# Patient Record
Sex: FEMALE | Race: WHITE | NOT HISPANIC OR LATINO
[De-identification: names, ages, dates, MRNs, and addresses within clinical notes are randomized per-mention and may not be internally consistent; named-entity substitution may affect disease eponyms.]

---

## 2018-03-28 ENCOUNTER — APPOINTMENT (OUTPATIENT)
Dept: OBGYN | Facility: CLINIC | Age: 33
End: 2018-03-28
Payer: COMMERCIAL

## 2018-03-28 VITALS
HEIGHT: 68 IN | SYSTOLIC BLOOD PRESSURE: 120 MMHG | WEIGHT: 140 LBS | DIASTOLIC BLOOD PRESSURE: 80 MMHG | BODY MASS INDEX: 21.22 KG/M2

## 2018-03-28 DIAGNOSIS — Z01.419 ENCOUNTER FOR GYNECOLOGICAL EXAMINATION (GENERAL) (ROUTINE) W/OUT ABNORMAL FINDINGS: ICD-10-CM

## 2018-03-28 PROCEDURE — 99395 PREV VISIT EST AGE 18-39: CPT

## 2018-03-30 LAB — HPV HIGH+LOW RISK DNA PNL CVX: NOT DETECTED

## 2018-04-02 LAB — CYTOLOGY CVX/VAG DOC THIN PREP: NORMAL

## 2018-12-26 ENCOUNTER — ASOB RESULT (OUTPATIENT)
Age: 33
End: 2018-12-26

## 2018-12-26 ENCOUNTER — APPOINTMENT (OUTPATIENT)
Dept: ANTEPARTUM | Facility: CLINIC | Age: 33
End: 2018-12-26
Payer: COMMERCIAL

## 2018-12-26 ENCOUNTER — APPOINTMENT (OUTPATIENT)
Dept: OBGYN | Facility: CLINIC | Age: 33
End: 2018-12-26
Payer: COMMERCIAL

## 2018-12-26 VITALS
WEIGHT: 140 LBS | SYSTOLIC BLOOD PRESSURE: 120 MMHG | DIASTOLIC BLOOD PRESSURE: 60 MMHG | BODY MASS INDEX: 21.22 KG/M2 | HEIGHT: 68 IN

## 2018-12-26 LAB — HCG UR QL: POSITIVE

## 2018-12-26 PROCEDURE — 99213 OFFICE O/P EST LOW 20 MIN: CPT

## 2018-12-26 PROCEDURE — 76817 TRANSVAGINAL US OBSTETRIC: CPT

## 2018-12-26 PROCEDURE — 81025 URINE PREGNANCY TEST: CPT

## 2019-01-09 ENCOUNTER — ASOB RESULT (OUTPATIENT)
Age: 34
End: 2019-01-09

## 2019-01-09 ENCOUNTER — APPOINTMENT (OUTPATIENT)
Dept: OBGYN | Facility: CLINIC | Age: 34
End: 2019-01-09
Payer: COMMERCIAL

## 2019-01-09 VITALS
HEIGHT: 68 IN | WEIGHT: 136 LBS | SYSTOLIC BLOOD PRESSURE: 112 MMHG | BODY MASS INDEX: 20.61 KG/M2 | DIASTOLIC BLOOD PRESSURE: 70 MMHG

## 2019-01-09 PROCEDURE — 0501F PRENATAL FLOW SHEET: CPT

## 2019-01-09 PROCEDURE — 76801 OB US < 14 WKS SINGLE FETUS: CPT

## 2019-01-16 ENCOUNTER — LABORATORY RESULT (OUTPATIENT)
Age: 34
End: 2019-01-16

## 2019-01-26 ENCOUNTER — APPOINTMENT (OUTPATIENT)
Dept: ANTEPARTUM | Facility: CLINIC | Age: 34
End: 2019-01-26
Payer: COMMERCIAL

## 2019-01-26 ENCOUNTER — ASOB RESULT (OUTPATIENT)
Age: 34
End: 2019-01-26

## 2019-01-26 PROCEDURE — 76813 OB US NUCHAL MEAS 1 GEST: CPT

## 2019-01-26 PROCEDURE — 36416 COLLJ CAPILLARY BLOOD SPEC: CPT

## 2019-01-30 ENCOUNTER — APPOINTMENT (OUTPATIENT)
Age: 34
End: 2019-01-30

## 2019-01-31 LAB
1ST TRIMESTER DATA: NORMAL
ADDENDUM DOC: NORMAL
AFP PNL SERPL: NORMAL
AFP SERPL-ACNC: NORMAL
CLINICAL BIOCHEMIST REVIEW: NORMAL
FREE BETA HCG 1ST TRIMESTER: NORMAL
Lab: NORMAL
NOTES NTD: NORMAL
NT: NORMAL
PAPP-A SERPL-ACNC: NORMAL
TRISOMY 18/3: NORMAL

## 2019-02-06 ENCOUNTER — APPOINTMENT (OUTPATIENT)
Dept: OBGYN | Facility: CLINIC | Age: 34
End: 2019-02-06
Payer: COMMERCIAL

## 2019-02-06 VITALS
HEIGHT: 68 IN | DIASTOLIC BLOOD PRESSURE: 74 MMHG | BODY MASS INDEX: 20.76 KG/M2 | SYSTOLIC BLOOD PRESSURE: 130 MMHG | WEIGHT: 137 LBS

## 2019-02-06 LAB
GLUCOSE UR-MCNC: NORMAL
PROT UR STRIP-MCNC: NORMAL

## 2019-02-06 PROCEDURE — 0502F SUBSEQUENT PRENATAL CARE: CPT

## 2019-02-13 ENCOUNTER — APPOINTMENT (OUTPATIENT)
Dept: ANTEPARTUM | Facility: CLINIC | Age: 34
End: 2019-02-13

## 2019-02-13 ENCOUNTER — APPOINTMENT (OUTPATIENT)
Dept: MATERNAL FETAL MEDICINE | Facility: CLINIC | Age: 34
End: 2019-02-13

## 2019-03-06 ENCOUNTER — APPOINTMENT (OUTPATIENT)
Dept: OBGYN | Facility: CLINIC | Age: 34
End: 2019-03-06
Payer: COMMERCIAL

## 2019-03-06 ENCOUNTER — NON-APPOINTMENT (OUTPATIENT)
Age: 34
End: 2019-03-06

## 2019-03-06 VITALS
SYSTOLIC BLOOD PRESSURE: 128 MMHG | HEIGHT: 68 IN | DIASTOLIC BLOOD PRESSURE: 86 MMHG | WEIGHT: 141 LBS | BODY MASS INDEX: 21.37 KG/M2

## 2019-03-06 PROCEDURE — 0502F SUBSEQUENT PRENATAL CARE: CPT

## 2019-03-27 ENCOUNTER — APPOINTMENT (OUTPATIENT)
Dept: ANTEPARTUM | Facility: CLINIC | Age: 34
End: 2019-03-27
Payer: COMMERCIAL

## 2019-03-27 ENCOUNTER — ASOB RESULT (OUTPATIENT)
Age: 34
End: 2019-03-27

## 2019-03-27 PROCEDURE — 76805 OB US >/= 14 WKS SNGL FETUS: CPT

## 2019-03-28 ENCOUNTER — APPOINTMENT (OUTPATIENT)
Dept: ANTEPARTUM | Facility: CLINIC | Age: 34
End: 2019-03-28

## 2019-04-01 LAB
1ST TRIMESTER DATA: NORMAL
2ND TRIMESTER DATA: NORMAL
AFP PNL SERPL: NORMAL
AFP SERPL-ACNC: NORMAL
AFP SERPL-ACNC: NORMAL
B-HCG FREE SERPL-MCNC: NORMAL
CLINICAL BIOCHEMIST REVIEW: NORMAL
FREE BETA HCG 1ST TRIMESTER: NORMAL
INHIBIN A SERPL-MCNC: NORMAL
NOTES NTD: NORMAL
NT: NORMAL
PAPP-A SERPL-ACNC: NORMAL
U ESTRIOL SERPL-SCNC: NORMAL

## 2019-04-03 ENCOUNTER — NON-APPOINTMENT (OUTPATIENT)
Age: 34
End: 2019-04-03

## 2019-04-03 ENCOUNTER — APPOINTMENT (OUTPATIENT)
Dept: OBGYN | Facility: CLINIC | Age: 34
End: 2019-04-03
Payer: COMMERCIAL

## 2019-04-03 VITALS
HEIGHT: 68 IN | WEIGHT: 142 LBS | BODY MASS INDEX: 21.52 KG/M2 | SYSTOLIC BLOOD PRESSURE: 128 MMHG | DIASTOLIC BLOOD PRESSURE: 70 MMHG

## 2019-04-03 PROCEDURE — 0502F SUBSEQUENT PRENATAL CARE: CPT

## 2019-04-15 ENCOUNTER — APPOINTMENT (OUTPATIENT)
Dept: MATERNAL FETAL MEDICINE | Facility: CLINIC | Age: 34
End: 2019-04-15
Payer: COMMERCIAL

## 2019-04-15 ENCOUNTER — ASOB RESULT (OUTPATIENT)
Age: 34
End: 2019-04-15

## 2019-04-15 PROCEDURE — 99241 OFFICE CONSULTATION NEW/ESTAB PATIENT 15 MIN: CPT

## 2019-04-26 ENCOUNTER — ASOB RESULT (OUTPATIENT)
Age: 34
End: 2019-04-26

## 2019-04-26 ENCOUNTER — APPOINTMENT (OUTPATIENT)
Age: 34
End: 2019-04-26
Payer: COMMERCIAL

## 2019-04-26 PROCEDURE — 76816 OB US FOLLOW-UP PER FETUS: CPT

## 2019-05-01 ENCOUNTER — LABORATORY RESULT (OUTPATIENT)
Age: 34
End: 2019-05-01

## 2019-05-01 ENCOUNTER — APPOINTMENT (OUTPATIENT)
Dept: OBGYN | Facility: CLINIC | Age: 34
End: 2019-05-01
Payer: COMMERCIAL

## 2019-05-01 VITALS
BODY MASS INDEX: 21.98 KG/M2 | WEIGHT: 145 LBS | HEIGHT: 68 IN | SYSTOLIC BLOOD PRESSURE: 120 MMHG | DIASTOLIC BLOOD PRESSURE: 68 MMHG

## 2019-05-01 PROCEDURE — 0502F SUBSEQUENT PRENATAL CARE: CPT

## 2019-05-02 ENCOUNTER — TRANSCRIPTION ENCOUNTER (OUTPATIENT)
Age: 34
End: 2019-05-02

## 2019-05-28 ENCOUNTER — APPOINTMENT (OUTPATIENT)
Dept: OBGYN | Facility: CLINIC | Age: 34
End: 2019-05-28
Payer: COMMERCIAL

## 2019-05-28 VITALS — DIASTOLIC BLOOD PRESSURE: 76 MMHG | SYSTOLIC BLOOD PRESSURE: 121 MMHG | HEIGHT: 68 IN

## 2019-05-28 PROCEDURE — 0502F SUBSEQUENT PRENATAL CARE: CPT

## 2019-06-12 ENCOUNTER — ASOB RESULT (OUTPATIENT)
Age: 34
End: 2019-06-12

## 2019-06-12 ENCOUNTER — APPOINTMENT (OUTPATIENT)
Dept: OBGYN | Facility: CLINIC | Age: 34
End: 2019-06-12
Payer: COMMERCIAL

## 2019-06-12 ENCOUNTER — APPOINTMENT (OUTPATIENT)
Dept: ANTEPARTUM | Facility: CLINIC | Age: 34
End: 2019-06-12
Payer: COMMERCIAL

## 2019-06-12 VITALS
HEIGHT: 68 IN | SYSTOLIC BLOOD PRESSURE: 120 MMHG | BODY MASS INDEX: 22.73 KG/M2 | WEIGHT: 150 LBS | DIASTOLIC BLOOD PRESSURE: 60 MMHG

## 2019-06-12 PROCEDURE — 76816 OB US FOLLOW-UP PER FETUS: CPT

## 2019-06-12 PROCEDURE — 0502F SUBSEQUENT PRENATAL CARE: CPT

## 2019-06-19 ENCOUNTER — APPOINTMENT (OUTPATIENT)
Dept: ANTEPARTUM | Facility: CLINIC | Age: 34
End: 2019-06-19

## 2019-06-28 ENCOUNTER — APPOINTMENT (OUTPATIENT)
Dept: OBGYN | Facility: CLINIC | Age: 34
End: 2019-06-28
Payer: COMMERCIAL

## 2019-06-28 VITALS
WEIGHT: 151 LBS | BODY MASS INDEX: 22.88 KG/M2 | DIASTOLIC BLOOD PRESSURE: 70 MMHG | SYSTOLIC BLOOD PRESSURE: 120 MMHG | HEIGHT: 68 IN

## 2019-06-28 PROCEDURE — 0502F SUBSEQUENT PRENATAL CARE: CPT

## 2019-07-08 ENCOUNTER — APPOINTMENT (OUTPATIENT)
Dept: OBGYN | Facility: CLINIC | Age: 34
End: 2019-07-08
Payer: COMMERCIAL

## 2019-07-08 ENCOUNTER — LABORATORY RESULT (OUTPATIENT)
Age: 34
End: 2019-07-08

## 2019-07-08 VITALS
WEIGHT: 155 LBS | BODY MASS INDEX: 23.49 KG/M2 | DIASTOLIC BLOOD PRESSURE: 79 MMHG | SYSTOLIC BLOOD PRESSURE: 121 MMHG | HEIGHT: 68 IN

## 2019-07-08 PROCEDURE — 90471 IMMUNIZATION ADMIN: CPT

## 2019-07-08 PROCEDURE — 90715 TDAP VACCINE 7 YRS/> IM: CPT

## 2019-07-08 PROCEDURE — 0502F SUBSEQUENT PRENATAL CARE: CPT

## 2019-07-10 ENCOUNTER — LABORATORY RESULT (OUTPATIENT)
Age: 34
End: 2019-07-10

## 2019-07-17 ENCOUNTER — APPOINTMENT (OUTPATIENT)
Dept: OBGYN | Facility: CLINIC | Age: 34
End: 2019-07-17
Payer: COMMERCIAL

## 2019-07-17 ENCOUNTER — APPOINTMENT (OUTPATIENT)
Dept: ANTEPARTUM | Facility: CLINIC | Age: 34
End: 2019-07-17

## 2019-07-17 VITALS
SYSTOLIC BLOOD PRESSURE: 120 MMHG | WEIGHT: 156 LBS | BODY MASS INDEX: 23.64 KG/M2 | DIASTOLIC BLOOD PRESSURE: 70 MMHG | HEIGHT: 68 IN

## 2019-07-17 PROCEDURE — 0502F SUBSEQUENT PRENATAL CARE: CPT

## 2019-07-24 ENCOUNTER — APPOINTMENT (OUTPATIENT)
Dept: ANTEPARTUM | Facility: CLINIC | Age: 34
End: 2019-07-24
Payer: COMMERCIAL

## 2019-07-24 ENCOUNTER — APPOINTMENT (OUTPATIENT)
Dept: OBGYN | Facility: CLINIC | Age: 34
End: 2019-07-24
Payer: COMMERCIAL

## 2019-07-24 ENCOUNTER — ASOB RESULT (OUTPATIENT)
Age: 34
End: 2019-07-24

## 2019-07-24 VITALS
SYSTOLIC BLOOD PRESSURE: 120 MMHG | WEIGHT: 156 LBS | HEIGHT: 68 IN | BODY MASS INDEX: 23.64 KG/M2 | DIASTOLIC BLOOD PRESSURE: 79 MMHG

## 2019-07-24 PROCEDURE — 0502F SUBSEQUENT PRENATAL CARE: CPT

## 2019-07-24 PROCEDURE — 76816 OB US FOLLOW-UP PER FETUS: CPT

## 2019-07-31 ENCOUNTER — APPOINTMENT (OUTPATIENT)
Dept: OBGYN | Facility: CLINIC | Age: 34
End: 2019-07-31
Payer: COMMERCIAL

## 2019-07-31 VITALS
WEIGHT: 156 LBS | SYSTOLIC BLOOD PRESSURE: 123 MMHG | BODY MASS INDEX: 23.64 KG/M2 | HEIGHT: 68 IN | DIASTOLIC BLOOD PRESSURE: 74 MMHG

## 2019-07-31 PROCEDURE — 0502F SUBSEQUENT PRENATAL CARE: CPT

## 2019-08-05 ENCOUNTER — INPATIENT (INPATIENT)
Facility: HOSPITAL | Age: 34
LOS: 2 days | Discharge: HOME | End: 2019-08-08
Payer: COMMERCIAL

## 2019-08-05 ENCOUNTER — APPOINTMENT (OUTPATIENT)
Dept: OBGYN | Facility: CLINIC | Age: 34
End: 2019-08-05
Payer: COMMERCIAL

## 2019-08-05 VITALS
DIASTOLIC BLOOD PRESSURE: 72 MMHG | SYSTOLIC BLOOD PRESSURE: 112 MMHG | HEIGHT: 68 IN | BODY MASS INDEX: 23.64 KG/M2 | WEIGHT: 156 LBS

## 2019-08-05 VITALS — RESPIRATION RATE: 14 BRPM | TEMPERATURE: 98 F

## 2019-08-05 DIAGNOSIS — O26.893 OTHER SPECIFIED PREGNANCY RELATED CONDITIONS, THIRD TRIMESTER: ICD-10-CM

## 2019-08-05 DIAGNOSIS — O47.1 FALSE LABOR AT OR AFTER 37 COMPLETED WEEKS OF GESTATION: ICD-10-CM

## 2019-08-05 LAB
APPEARANCE UR: CLEAR — SIGNIFICANT CHANGE UP
BASOPHILS # BLD AUTO: 0.05 K/UL — SIGNIFICANT CHANGE UP (ref 0–0.2)
BASOPHILS NFR BLD AUTO: 0.4 % — SIGNIFICANT CHANGE UP (ref 0–2)
BILIRUB UR-MCNC: NEGATIVE — SIGNIFICANT CHANGE UP
BLD GP AB SCN SERPL QL: SIGNIFICANT CHANGE UP
COLOR SPEC: YELLOW — SIGNIFICANT CHANGE UP
DIFF PNL FLD: ABNORMAL
EOSINOPHIL # BLD AUTO: 0.03 K/UL — SIGNIFICANT CHANGE UP (ref 0–0.5)
EOSINOPHIL NFR BLD AUTO: 0.2 % — SIGNIFICANT CHANGE UP (ref 0–6)
EPI CELLS # UR: SIGNIFICANT CHANGE UP
GLUCOSE UR QL: NEGATIVE MG/DL — SIGNIFICANT CHANGE UP
HCT VFR BLD CALC: 39.7 % — SIGNIFICANT CHANGE UP (ref 34.5–45)
HGB BLD-MCNC: 13.3 G/DL — SIGNIFICANT CHANGE UP (ref 11.5–15.5)
IMM GRANULOCYTES NFR BLD AUTO: 1.4 % — SIGNIFICANT CHANGE UP (ref 0–1.5)
KETONES UR-MCNC: ABNORMAL
LEUKOCYTE ESTERASE UR-ACNC: NEGATIVE — SIGNIFICANT CHANGE UP
LYMPHOCYTES # BLD AUTO: 15.3 % — SIGNIFICANT CHANGE UP (ref 13–44)
LYMPHOCYTES # BLD AUTO: 2.01 K/UL — SIGNIFICANT CHANGE UP (ref 1–3.3)
MCHC RBC-ENTMCNC: 29.8 PG — SIGNIFICANT CHANGE UP (ref 27–34)
MCHC RBC-ENTMCNC: 33.5 GM/DL — SIGNIFICANT CHANGE UP (ref 32–36)
MCV RBC AUTO: 88.8 FL — SIGNIFICANT CHANGE UP (ref 80–100)
MONOCYTES # BLD AUTO: 0.95 K/UL — HIGH (ref 0–0.9)
MONOCYTES NFR BLD AUTO: 7.2 % — SIGNIFICANT CHANGE UP (ref 2–14)
NEUTROPHILS # BLD AUTO: 9.95 K/UL — HIGH (ref 1.8–7.4)
NEUTROPHILS NFR BLD AUTO: 75.5 % — SIGNIFICANT CHANGE UP (ref 43–77)
NITRITE UR-MCNC: NEGATIVE — SIGNIFICANT CHANGE UP
PH UR: 6 — SIGNIFICANT CHANGE UP (ref 5–8)
PLATELET # BLD AUTO: 280 K/UL — SIGNIFICANT CHANGE UP (ref 150–400)
PROT UR-MCNC: 15 MG/DL
RBC # BLD: 4.47 M/UL — SIGNIFICANT CHANGE UP (ref 3.8–5.2)
RBC # FLD: 13.2 % — SIGNIFICANT CHANGE UP (ref 10.3–14.5)
RBC CASTS # UR COMP ASSIST: SIGNIFICANT CHANGE UP /HPF (ref 0–4)
SP GR SPEC: 1.02 — SIGNIFICANT CHANGE UP (ref 1.01–1.02)
UROBILINOGEN FLD QL: NEGATIVE MG/DL — SIGNIFICANT CHANGE UP
WBC # BLD: 13.18 K/UL — HIGH (ref 3.8–10.5)
WBC # FLD AUTO: 13.18 K/UL — HIGH (ref 3.8–10.5)
WBC UR QL: NEGATIVE — SIGNIFICANT CHANGE UP

## 2019-08-05 PROCEDURE — 0502F SUBSEQUENT PRENATAL CARE: CPT

## 2019-08-05 PROCEDURE — 59025 FETAL NON-STRESS TEST: CPT

## 2019-08-05 RX ORDER — CITRIC ACID/SODIUM CITRATE 300-500 MG
30 SOLUTION, ORAL ORAL ONCE
Refills: 0 | Status: COMPLETED | OUTPATIENT
Start: 2019-08-05 | End: 2019-08-05

## 2019-08-05 RX ORDER — SODIUM CHLORIDE 9 MG/ML
1000 INJECTION, SOLUTION INTRAVENOUS ONCE
Refills: 0 | Status: COMPLETED | OUTPATIENT
Start: 2019-08-05 | End: 2019-08-05

## 2019-08-05 RX ORDER — SODIUM CHLORIDE 9 MG/ML
1000 INJECTION, SOLUTION INTRAVENOUS
Refills: 0 | Status: DISCONTINUED | OUTPATIENT
Start: 2019-08-05 | End: 2019-08-06

## 2019-08-05 RX ORDER — OXYTOCIN 10 UNIT/ML
333.33 VIAL (ML) INJECTION
Qty: 20 | Refills: 0 | Status: COMPLETED | OUTPATIENT
Start: 2019-08-05 | End: 2019-08-06

## 2019-08-05 RX ADMIN — SODIUM CHLORIDE 125 MILLILITER(S): 9 INJECTION, SOLUTION INTRAVENOUS at 22:45

## 2019-08-05 RX ADMIN — SODIUM CHLORIDE 2000 MILLILITER(S): 9 INJECTION, SOLUTION INTRAVENOUS at 22:26

## 2019-08-05 RX ADMIN — Medication 30 MILLILITER(S): at 23:08

## 2019-08-05 NOTE — OB PROVIDER H&P - ASSESSMENT
A/P: 34y year old  here at 39.4 wks GA here for early labor and non-reassuring tracing/ NST in office.   -admit to L&D  - continuous monitoring   -routine labs  -IV fluids  -discussed with Dr. Meng

## 2019-08-05 NOTE — OB PROVIDER H&P - HISTORY OF PRESENT ILLNESS
34y year old  at 39 wks 4 days consistent wimp LMP (18) sent in from the office because she was 2-3 cm and had 1 late deceleration. EFW based off of last MFM sono is 3600 g.     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 EFW based off of last MFM sono is 3600 g.     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

## 2019-08-05 NOTE — CHART NOTE - NSCHARTNOTEFT_GEN_A_CORE
08-05-19 @ 23:47  Patient was evaluated at bedside s/p Epidural  Patient currently endorsing abdominal discomfort secondary to contractions  Otherwise no additional complaints.    FHT: 130 bpm, moderate variability, +accelerations, late deceleration present  [place]: Regular Ctxs every 2-3 minutes.  SVE: 6-7/9/0      Plan:  - Patient presented with late deceleration; placed in right lateral decubitus and currently receiving 1L bolus of LR  - Cat 2 tracing  - Will continue to reassess prn.

## 2019-08-05 NOTE — OB PROVIDER H&P - ATTENDING COMMENTS
39+4 weeks in labor, cat 2 fht w occ decelerations, rare accels.  arom bloody tinged fluid  ve 4/90/-2  a/p allow labor.

## 2019-08-06 LAB
HCT VFR BLD CALC: 34.3 % — LOW (ref 34.5–45)
HGB BLD-MCNC: 11.5 G/DL — SIGNIFICANT CHANGE UP (ref 11.5–15.5)

## 2019-08-06 PROCEDURE — 59400 OBSTETRICAL CARE: CPT

## 2019-08-06 RX ORDER — HYDROCORTISONE 1 %
1 OINTMENT (GRAM) TOPICAL EVERY 6 HOURS
Refills: 0 | Status: DISCONTINUED | OUTPATIENT
Start: 2019-08-06 | End: 2019-08-08

## 2019-08-06 RX ORDER — IBUPROFEN 200 MG
600 TABLET ORAL EVERY 6 HOURS
Refills: 0 | Status: DISCONTINUED | OUTPATIENT
Start: 2019-08-06 | End: 2019-08-08

## 2019-08-06 RX ORDER — DOCUSATE SODIUM 100 MG
100 CAPSULE ORAL
Refills: 0 | Status: DISCONTINUED | OUTPATIENT
Start: 2019-08-06 | End: 2019-08-08

## 2019-08-06 RX ORDER — DIPHENHYDRAMINE HCL 50 MG
25 CAPSULE ORAL EVERY 6 HOURS
Refills: 0 | Status: DISCONTINUED | OUTPATIENT
Start: 2019-08-06 | End: 2019-08-08

## 2019-08-06 RX ORDER — DIBUCAINE 1 %
1 OINTMENT (GRAM) RECTAL EVERY 6 HOURS
Refills: 0 | Status: DISCONTINUED | OUTPATIENT
Start: 2019-08-06 | End: 2019-08-08

## 2019-08-06 RX ORDER — SIMETHICONE 80 MG/1
80 TABLET, CHEWABLE ORAL EVERY 4 HOURS
Refills: 0 | Status: DISCONTINUED | OUTPATIENT
Start: 2019-08-06 | End: 2019-08-08

## 2019-08-06 RX ORDER — GLYCERIN ADULT
1 SUPPOSITORY, RECTAL RECTAL AT BEDTIME
Refills: 0 | Status: DISCONTINUED | OUTPATIENT
Start: 2019-08-06 | End: 2019-08-08

## 2019-08-06 RX ORDER — PRAMOXINE HYDROCHLORIDE 150 MG/15G
1 AEROSOL, FOAM RECTAL EVERY 4 HOURS
Refills: 0 | Status: DISCONTINUED | OUTPATIENT
Start: 2019-08-06 | End: 2019-08-08

## 2019-08-06 RX ORDER — KETOROLAC TROMETHAMINE 30 MG/ML
30 SYRINGE (ML) INJECTION ONCE
Refills: 0 | Status: DISCONTINUED | OUTPATIENT
Start: 2019-08-06 | End: 2019-08-06

## 2019-08-06 RX ORDER — BENZOCAINE 10 %
1 GEL (GRAM) MUCOUS MEMBRANE EVERY 6 HOURS
Refills: 0 | Status: DISCONTINUED | OUTPATIENT
Start: 2019-08-06 | End: 2019-08-08

## 2019-08-06 RX ORDER — OXYCODONE HYDROCHLORIDE 5 MG/1
5 TABLET ORAL
Refills: 0 | Status: DISCONTINUED | OUTPATIENT
Start: 2019-08-06 | End: 2019-08-08

## 2019-08-06 RX ORDER — MAGNESIUM HYDROXIDE 400 MG/1
30 TABLET, CHEWABLE ORAL
Refills: 0 | Status: DISCONTINUED | OUTPATIENT
Start: 2019-08-06 | End: 2019-08-08

## 2019-08-06 RX ORDER — AER TRAVELER 0.5 G/1
1 SOLUTION RECTAL; TOPICAL EVERY 4 HOURS
Refills: 0 | Status: DISCONTINUED | OUTPATIENT
Start: 2019-08-06 | End: 2019-08-08

## 2019-08-06 RX ORDER — OXYCODONE HYDROCHLORIDE 5 MG/1
5 TABLET ORAL ONCE
Refills: 0 | Status: DISCONTINUED | OUTPATIENT
Start: 2019-08-06 | End: 2019-08-08

## 2019-08-06 RX ORDER — LANOLIN
1 OINTMENT (GRAM) TOPICAL EVERY 6 HOURS
Refills: 0 | Status: DISCONTINUED | OUTPATIENT
Start: 2019-08-06 | End: 2019-08-08

## 2019-08-06 RX ORDER — SODIUM CHLORIDE 9 MG/ML
3 INJECTION INTRAMUSCULAR; INTRAVENOUS; SUBCUTANEOUS EVERY 8 HOURS
Refills: 0 | Status: DISCONTINUED | OUTPATIENT
Start: 2019-08-06 | End: 2019-08-08

## 2019-08-06 RX ORDER — IBUPROFEN 200 MG
600 TABLET ORAL EVERY 6 HOURS
Refills: 0 | Status: COMPLETED | OUTPATIENT
Start: 2019-08-06 | End: 2020-07-04

## 2019-08-06 RX ORDER — ACETAMINOPHEN 500 MG
975 TABLET ORAL
Refills: 0 | Status: DISCONTINUED | OUTPATIENT
Start: 2019-08-06 | End: 2019-08-08

## 2019-08-06 RX ORDER — TETANUS TOXOID, REDUCED DIPHTHERIA TOXOID AND ACELLULAR PERTUSSIS VACCINE, ADSORBED 5; 2.5; 8; 8; 2.5 [IU]/.5ML; [IU]/.5ML; UG/.5ML; UG/.5ML; UG/.5ML
0.5 SUSPENSION INTRAMUSCULAR ONCE
Refills: 0 | Status: DISCONTINUED | OUTPATIENT
Start: 2019-08-06 | End: 2019-08-08

## 2019-08-06 RX ORDER — OXYTOCIN 10 UNIT/ML
VIAL (ML) INJECTION
Qty: 20 | Refills: 0 | Status: DISCONTINUED | OUTPATIENT
Start: 2019-08-06 | End: 2019-08-08

## 2019-08-06 RX ADMIN — Medication 30 MILLIGRAM(S): at 02:55

## 2019-08-06 RX ADMIN — Medication 600 MILLIGRAM(S): at 17:54

## 2019-08-06 RX ADMIN — Medication 1000 MILLIUNIT(S)/MIN: at 02:55

## 2019-08-06 RX ADMIN — SODIUM CHLORIDE 3 MILLILITER(S): 9 INJECTION INTRAMUSCULAR; INTRAVENOUS; SUBCUTANEOUS at 14:52

## 2019-08-06 RX ADMIN — Medication 600 MILLIGRAM(S): at 18:30

## 2019-08-06 RX ADMIN — Medication 600 MILLIGRAM(S): at 13:00

## 2019-08-06 RX ADMIN — Medication 975 MILLIGRAM(S): at 10:30

## 2019-08-06 RX ADMIN — Medication 975 MILLIGRAM(S): at 15:53

## 2019-08-06 RX ADMIN — Medication 975 MILLIGRAM(S): at 09:44

## 2019-08-06 RX ADMIN — Medication 1 TABLET(S): at 12:14

## 2019-08-06 RX ADMIN — SODIUM CHLORIDE 3 MILLILITER(S): 9 INJECTION INTRAMUSCULAR; INTRAVENOUS; SUBCUTANEOUS at 05:33

## 2019-08-06 RX ADMIN — Medication 600 MILLIGRAM(S): at 12:14

## 2019-08-06 RX ADMIN — SODIUM CHLORIDE 125 MILLILITER(S): 9 INJECTION, SOLUTION INTRAVENOUS at 00:23

## 2019-08-06 RX ADMIN — Medication 975 MILLIGRAM(S): at 16:30

## 2019-08-06 RX ADMIN — Medication 600 MILLIGRAM(S): at 23:55

## 2019-08-06 NOTE — OB PROVIDER DELIVERY SUMMARY - NSPROVIDERDELIVERYNOTE_OBGYN_ALL_OB_FT
Patient felt rectal pressure and was found to be fully dilated, 0 station. Light meconium noted while pushing. She pushed effectively for  60 minutes. In conjunction with maternal effort, she delivered a viable male infant.    Vertex delivered without difficulty, Nuchal cord noted x1, Anterior shoulders then delivered without difficulty. Placenta delivered spontaneously and intact. Pitocin started. Excellent hemostasis was achieved. Perineum and vagina were inspected and 2nd degree laceration with bilateral labial tears were noted and repaired with 2-0 Vicryl and chromic sutures.  weight is 7lbs 3oz, apgars 9/9, EBL 200cc

## 2019-08-06 NOTE — OB NEONATOLOGY/PEDIATRICIAN DELIVERY SUMMARY - NSPEDSNEONOTESA_OBGYN_ALL_OB_FT
Dr. Serrano requested me to attend Vaginal delivery at 39.5 weeks due to NRFHT with meconium. The mother is 35 y/o, , A+, HIV, RPR, HBsAg, GBS are NR, RI. Mom had late del in office  L & D: Heavy  Meconium, tight CAN, cut at the perineum, suctioned and dried, voided and passed meconium, APGAR 9& 9 , BW: 3260gm (7-3)  Asst: full term appropriate for gestational age, BB, , Meconium stained AF  Plan: Observe in transition, if stable admit to NBN.

## 2019-08-06 NOTE — OB RN DELIVERY SUMMARY - NS_SKINCOMMENTSA_OBGYN_ALL_OB_FT
infant immediately goes skin-to-skin at birth, Sacha OLIVO requests infant at warmer at 1min oflife d/t Meconium stained fluid, skin-to-skin resumed at 0205

## 2019-08-07 ENCOUNTER — TRANSCRIPTION ENCOUNTER (OUTPATIENT)
Age: 34
End: 2019-08-07

## 2019-08-07 LAB — T PALLIDUM AB TITR SER: NEGATIVE — SIGNIFICANT CHANGE UP

## 2019-08-07 RX ADMIN — Medication 600 MILLIGRAM(S): at 05:44

## 2019-08-07 RX ADMIN — Medication 600 MILLIGRAM(S): at 13:51

## 2019-08-07 RX ADMIN — Medication 600 MILLIGRAM(S): at 00:55

## 2019-08-07 RX ADMIN — Medication 600 MILLIGRAM(S): at 23:34

## 2019-08-07 RX ADMIN — Medication 600 MILLIGRAM(S): at 14:45

## 2019-08-07 RX ADMIN — Medication 1 TABLET(S): at 13:52

## 2019-08-07 RX ADMIN — Medication 600 MILLIGRAM(S): at 18:28

## 2019-08-07 RX ADMIN — Medication 600 MILLIGRAM(S): at 06:40

## 2019-08-07 RX ADMIN — Medication 600 MILLIGRAM(S): at 19:07

## 2019-08-07 NOTE — PROGRESS NOTE ADULT - ASSESSMENT
34y yo   s/p vaginal delivery PPD1. Stable. No current complaints.  - Out of bed. Aggressive ambulation.  - Analgesia PRN  - Regular diet

## 2019-08-07 NOTE — PROGRESS NOTE ADULT - SUBJECTIVE AND OBJECTIVE BOX
Patient is a 34y woman  ; PPD# 1    Subjective:  - The patient seen and examined at bedside. No acute overnight events.   - She feels well, pain is well controlled.   - She is ambulating and tolerating a diet.   - -Flatus, - BM. Patient is voiding without difficulty.   - She denies nausea/vomiting, breathing problems, headache and visual changes.  - Lochia wnl.  - Breastfeeding without difficulty.    Vital Signs Last 24 Hrs  T(C): 36.6 (06 Aug 2019 20:40), Max: 37.1 (06 Aug 2019 09:00)  T(F): 97.9 (06 Aug 2019 20:40), Max: 98.8 (06 Aug 2019 09:00)  HR: 85 (06 Aug 2019 20:40) (85 - 95)  BP: 103/72 (06 Aug 2019 20:40) (92/65 - 103/72)  BP(mean): --  RR: 18 (06 Aug 2019 20:40) (18 - 18)  SpO2: 98% (06 Aug 2019 20:40) (95% - 98%)    Physical exam:  General: NAD. Appears well.  No increased work of breathing.  Abdomen: soft, nontender, nondistended, firm uterine fundus.  Pelvic: Normal lochia noted.  Ext: No DVT signs, warm extremities, no edema.    Allergies    No Known Allergies    Intolerances        LABS:                        11.5   x     )-----------( x        ( 06 Aug 2019 08:34 )             34.3

## 2019-08-08 VITALS
SYSTOLIC BLOOD PRESSURE: 112 MMHG | RESPIRATION RATE: 18 BRPM | HEART RATE: 73 BPM | TEMPERATURE: 99 F | DIASTOLIC BLOOD PRESSURE: 69 MMHG

## 2019-08-08 PROCEDURE — 85014 HEMATOCRIT: CPT

## 2019-08-08 PROCEDURE — 86850 RBC ANTIBODY SCREEN: CPT

## 2019-08-08 PROCEDURE — 81001 URINALYSIS AUTO W/SCOPE: CPT

## 2019-08-08 PROCEDURE — 85027 COMPLETE CBC AUTOMATED: CPT

## 2019-08-08 PROCEDURE — 86780 TREPONEMA PALLIDUM: CPT

## 2019-08-08 PROCEDURE — 36415 COLL VENOUS BLD VENIPUNCTURE: CPT

## 2019-08-08 PROCEDURE — 86900 BLOOD TYPING SEROLOGIC ABO: CPT

## 2019-08-08 PROCEDURE — 85018 HEMOGLOBIN: CPT

## 2019-08-08 PROCEDURE — 86901 BLOOD TYPING SEROLOGIC RH(D): CPT

## 2019-08-08 RX ORDER — IBUPROFEN 200 MG
1 TABLET ORAL
Qty: 20 | Refills: 0
Start: 2019-08-08 | End: 2019-08-12

## 2019-08-08 RX ADMIN — Medication 600 MILLIGRAM(S): at 11:45

## 2019-08-08 RX ADMIN — SIMETHICONE 80 MILLIGRAM(S): 80 TABLET, CHEWABLE ORAL at 11:45

## 2019-08-08 RX ADMIN — Medication 600 MILLIGRAM(S): at 05:23

## 2019-08-08 RX ADMIN — Medication 600 MILLIGRAM(S): at 05:53

## 2019-08-08 RX ADMIN — Medication 600 MILLIGRAM(S): at 00:04

## 2019-08-08 NOTE — PROGRESS NOTE ADULT - ATTENDING COMMENTS
pt seen; desires circ; risks/benefits d/w pt
Patient seen and examined by me.  Agree with resident note.  Pain well controlled.  Tolerating regular diet, no nausea or vomiting.  Breastfeeding.  Minimal lochia.  Ambulating.  She is voiding without difficulty. Denies HA, dizziness, CP, SOB, LE pain.  VSS.  Fundus firm.  Plan for DC home today.  DC instructions and call parameters reviewed.  RTO in 6 weeks for pp visit.

## 2019-08-08 NOTE — DISCHARGE NOTE OB - HOSPITAL COURSE
She is a 33 yo now  who presented at [id number] for early labor and non-reassuring fetal heart tracing. Vaginal delivery was uncomplicated. She had a normal postpartum course and was discharged home in stable condition on postpartum day 2.

## 2019-08-08 NOTE — DISCHARGE NOTE OB - CARE PROVIDERS DIRECT ADDRESSES
[email or web address]

## 2019-08-08 NOTE — DISCHARGE NOTE OB - ADDITIONAL INSTRUCTIONS
DISPLAY PLAN FREE TEXT Patient should transition to regular activity level. Resume regular diet. Patient should follow up with her OB for a postpartum checkup 6 weeks after delivery. Patient should call her doctor sooner if she develops a fever or uncontrolled vaginal bleeding or fevers. Please call sooner if there are any other concerns.

## 2019-08-08 NOTE — DISCHARGE NOTE OB - CARE PLAN
Principal Discharge DX:	Vaginal delivery  Goal:	Rapid recovery  Assessment and plan of treatment:	Patient should transition to regular activity level. Resume regular diet. Patient should follow up with her OB for a postpartum checkup 6 weeks after delivery. Patient should call her doctor sooner if she develops a fever or uncontrolled vaginal bleeding or fevers. Please call sooner if there are any other concerns.

## 2019-08-08 NOTE — PROGRESS NOTE ADULT - SUBJECTIVE AND OBJECTIVE BOX
34y year old  PPP#2 s/p  at [id number]    S:   Patient seen and examined at bedside thia AM  No acute overnight events. Pain well controlled.   Patient is ambulating, +voiding, +flatus, -BM  Reports minimal lochia.   +breast feeding, -breast tenderness    VS:   Vital Signs Last 24 Hrs  T(C): 36.6 (07 Aug 2019 08:01), Max: 36.6 (07 Aug 2019 08:01)  T(F): 97.9 (07 Aug 2019 08:01), Max: 97.9 (07 Aug 2019 08:01)  HR: 75 (07 Aug 2019 08:01) (75 - 75)  BP: 102/71 (07 Aug 2019 08:01) (102/71 - 102/71)  RR: 18 (07 Aug 2019 08:01) (18 - 18)      Physical Exam:  General: NAD  CV: RRR no m/g/r  RESP: CTA b/l  Abdomen: soft, ND, firm fundus palpated at the umbilicus.   Pelvic: + lochia  Ext: nontender lower extremity pain bilaterally.    Labs:                        11.5   x     )-----------( x        ( 06 Aug 2019 08:34 )             34.3 34y year old  PPP#2 s/p  at [id number]    S:   Patient seen and examined at bedside this AM  No acute overnight events. Pain well controlled.   Patient is ambulating, +voiding, +flatus, -BM  Reports minimal lochia.   +breast feeding, -breast tenderness    VS:   Vital Signs Last 24 Hrs  T(C): 36.6 (07 Aug 2019 08:01), Max: 36.6 (07 Aug 2019 08:01)  T(F): 97.9 (07 Aug 2019 08:01), Max: 97.9 (07 Aug 2019 08:01)  HR: 75 (07 Aug 2019 08:01) (75 - 75)  BP: 102/71 (07 Aug 2019 08:01) (102/71 - 102/71)  RR: 18 (07 Aug 2019 08:01) (18 - 18)      Physical Exam:  General: NAD  CV: RRR no m/g/r  RESP: CTA b/l  Abdomen: soft, ND, firm fundus palpated at the umbilicus.   Pelvic: + lochia  Ext: nontender lower extremity pain bilaterally.    Labs:                        11.5   x     )-----------( x        ( 06 Aug 2019 08:34 )             34.3

## 2019-08-08 NOTE — DISCHARGE NOTE OB - MEDICATION SUMMARY - MEDICATIONS TO TAKE
I will START or STAY ON the medications listed below when I get home from the hospital:    ibuprofen 600 mg oral tablet  -- 1 tab(s) by mouth every 6 hours MDD:4  -- Do not take this drug if you are pregnant.  It is very important that you take or use this exactly as directed.  Do not skip doses or discontinue unless directed by your doctor.  May cause drowsiness or dizziness.  Obtain medical advice before taking any non-prescription drugs as some may affect the action of this medication.  Take with food or milk.    -- Indication: For Moderate pain    oxycodone-acetaminophen 5 mg-325 mg oral tablet  -- 1 tab(s) by mouth every 8 hours MDD:3  -- Caution federal law prohibits the transfer of this drug to any person other  than the person for whom it was prescribed.  May cause drowsiness.  Alcohol may intensify this effect.  Use care when operating dangerous machinery.  This prescription cannot be refilled.  This product contains acetaminophen.  Do not use  with any other product containing acetaminophen to prevent possible liver damage.  Using more of this medication than prescribed may cause serious breathing problems.    -- Indication: For Severe pain

## 2019-08-08 NOTE — PROGRESS NOTE ADULT - ASSESSMENT
34y year old  PPP#2 s/p  at [id number]    Plan:  She feels well  Continue the current pain medication  Encourage  Ambulation  Encourage regular diet  DVT ppx: SCDs only when not ambulating  Plan for discharge today pending attending assessment

## 2019-08-08 NOTE — DISCHARGE NOTE OB - PATIENT PORTAL LINK FT
You can access the [facility] Portal, offered by [facility], by registering with the following website: [email or web address]

## 2019-08-08 NOTE — DISCHARGE NOTE OB - MATERIALS PROVIDED
Breastfeeding Mother’s Support Group Information/Guide to Postpartum Care/Birth Certificate Instructions/  Immunization Record/Breastfeeding Log/Back To Sleep Handout/Shaken Baby Prevention Handout/Vaccinations/Discharge Medication Information for Patients and Families Pocket Guide/[facility] Hearing Screen Program/Breastfeeding Guide and Packet/[facility] [place] Screening Program

## 2019-08-08 NOTE — DISCHARGE NOTE OB - CARE PROVIDER_API CALL
Traci Barclay)  Obstetrics and Gynecology  [address] [place] [address]  Phone: [phone]  Fax: [phone]  Follow Up Time:

## 2019-09-17 ENCOUNTER — APPOINTMENT (OUTPATIENT)
Dept: OBGYN | Facility: CLINIC | Age: 34
End: 2019-09-17

## 2019-10-23 ENCOUNTER — APPOINTMENT (OUTPATIENT)
Dept: OBGYN | Facility: CLINIC | Age: 34
End: 2019-10-23
Payer: COMMERCIAL

## 2019-10-23 VITALS
HEIGHT: 68 IN | DIASTOLIC BLOOD PRESSURE: 83 MMHG | BODY MASS INDEX: 20 KG/M2 | SYSTOLIC BLOOD PRESSURE: 124 MMHG | WEIGHT: 132 LBS

## 2019-10-23 PROCEDURE — 99395 PREV VISIT EST AGE 18-39: CPT

## 2019-10-23 NOTE — PHYSICAL EXAM
[Awake] : awake [Alert] : alert [Acute Distress] : no acute distress [Mass] : no breast mass [Axillary LAD] : no axillary lymphadenopathy [Nipple Discharge] : no nipple discharge [Tender] : non tender [Soft] : soft [Oriented x3] : oriented to person, place, and time [Normal] : uterus [Uterine Adnexae] : were not tender and not enlarged [No Bleeding] : there was no active vaginal bleeding [CTAB] : CTAB [RRR, No Murmurs] : RRR, no murmurs

## 2019-10-24 LAB — HPV HIGH+LOW RISK DNA PNL CVX: NOT DETECTED

## 2019-11-01 LAB — CYTOLOGY CVX/VAG DOC THIN PREP: NORMAL

## 2020-04-30 ENCOUNTER — MESSAGE (OUTPATIENT)
Age: 35
End: 2020-04-30

## 2022-01-12 ENCOUNTER — NON-APPOINTMENT (OUTPATIENT)
Age: 37
End: 2022-01-12

## 2022-01-25 ENCOUNTER — APPOINTMENT (OUTPATIENT)
Dept: OBGYN | Facility: CLINIC | Age: 37
End: 2022-01-25
Payer: COMMERCIAL

## 2022-01-25 VITALS
DIASTOLIC BLOOD PRESSURE: 70 MMHG | HEIGHT: 68 IN | SYSTOLIC BLOOD PRESSURE: 120 MMHG | WEIGHT: 135 LBS | BODY MASS INDEX: 20.46 KG/M2

## 2022-01-25 DIAGNOSIS — Z00.00 ENCOUNTER FOR GENERAL ADULT MEDICAL EXAMINATION W/OUT ABNORMAL FINDINGS: ICD-10-CM

## 2022-01-25 LAB — HCG UR QL: POSITIVE

## 2022-01-25 PROCEDURE — 76817 TRANSVAGINAL US OBSTETRIC: CPT

## 2022-01-25 PROCEDURE — 81025 URINE PREGNANCY TEST: CPT

## 2022-01-25 PROCEDURE — 99213 OFFICE O/P EST LOW 20 MIN: CPT | Mod: 25

## 2022-01-25 PROCEDURE — 99395 PREV VISIT EST AGE 18-39: CPT

## 2022-01-26 NOTE — DISCUSSION/SUMMARY
[FreeTextEntry1] : sec amen- us shows viable aga iup. \par dw pt dietary restricitions, activity guidelines, pnv, covid booster. spent 25 min in consultation.

## 2022-01-26 NOTE — PROCEDURE
[Transvaginal OB Sonogram] : Transvaginal OB Sonogram [Transabdominal OB Sonogram] : Transabdominal OB Sonogram [Intrauterine Pregnancy] : intrauterine pregnancy [Yolk Sac] : yolk sac present [Fetal Heart] : fetal heart present [CRL: ___ (mm)] : CRL - [unfilled]Umm [Date: ___] : Date: [unfilled] [Current GA by Sonogram: ___ (wks)] : Current GA by Sonogram: [unfilled]Uwks [___ day(s)] : [unfilled] days [Transvaginal OB Sonogram WNL] : Transvaginal OB Sonogram - abnormalities noted [Transabdominal OB Sonogram WNL] : Transabdominal OB Sonogram WNL

## 2022-01-26 NOTE — HISTORY OF PRESENT ILLNESS
[FreeTextEntry1] : 37 yo p1 here for annual exam. Reports amenorrhea since dec 4, cycles run 28-30 days. feeling well. no new \par medical issues. Has + ucg here today.

## 2022-01-28 LAB
C TRACH RRNA SPEC QL NAA+PROBE: NOT DETECTED
HPV HIGH+LOW RISK DNA PNL CVX: NOT DETECTED
N GONORRHOEA RRNA SPEC QL NAA+PROBE: NOT DETECTED
SOURCE TP AMPLIFICATION: NORMAL

## 2022-02-02 LAB — CYTOLOGY CVX/VAG DOC THIN PREP: NORMAL

## 2022-02-14 ENCOUNTER — NON-APPOINTMENT (OUTPATIENT)
Age: 37
End: 2022-02-14

## 2022-02-15 ENCOUNTER — NON-APPOINTMENT (OUTPATIENT)
Age: 37
End: 2022-02-15

## 2022-02-15 ENCOUNTER — ASOB RESULT (OUTPATIENT)
Age: 37
End: 2022-02-15

## 2022-02-15 ENCOUNTER — APPOINTMENT (OUTPATIENT)
Dept: OBGYN | Facility: CLINIC | Age: 37
End: 2022-02-15
Payer: COMMERCIAL

## 2022-02-15 ENCOUNTER — APPOINTMENT (OUTPATIENT)
Dept: ANTEPARTUM | Facility: CLINIC | Age: 37
End: 2022-02-15
Payer: COMMERCIAL

## 2022-02-15 VITALS
HEIGHT: 68 IN | HEART RATE: 106 BPM | BODY MASS INDEX: 20.31 KG/M2 | SYSTOLIC BLOOD PRESSURE: 140 MMHG | WEIGHT: 134 LBS | DIASTOLIC BLOOD PRESSURE: 85 MMHG

## 2022-02-15 PROCEDURE — 76801 OB US < 14 WKS SINGLE FETUS: CPT

## 2022-02-15 PROCEDURE — 0501F PRENATAL FLOW SHEET: CPT

## 2022-03-01 ENCOUNTER — APPOINTMENT (OUTPATIENT)
Dept: ANTEPARTUM | Facility: CLINIC | Age: 37
End: 2022-03-01
Payer: COMMERCIAL

## 2022-03-01 ENCOUNTER — ASOB RESULT (OUTPATIENT)
Age: 37
End: 2022-03-01

## 2022-03-01 PROCEDURE — 76813 OB US NUCHAL MEAS 1 GEST: CPT

## 2022-03-01 PROCEDURE — 36416 COLLJ CAPILLARY BLOOD SPEC: CPT

## 2022-03-01 PROCEDURE — ZZZZZ: CPT

## 2022-03-04 ENCOUNTER — LABORATORY RESULT (OUTPATIENT)
Age: 37
End: 2022-03-04

## 2022-03-04 ENCOUNTER — NON-APPOINTMENT (OUTPATIENT)
Age: 37
End: 2022-03-04

## 2022-03-04 LAB
1ST TRIMESTER DATA: NORMAL
ADDENDUM DOC: NORMAL
AFP PNL SERPL: NORMAL
AFP SERPL-ACNC: NORMAL
CLINICAL BIOCHEMIST REVIEW: NORMAL
FREE BETA HCG 1ST TRIMESTER: NORMAL
Lab: NORMAL
NASAL BONE: PRESENT
NOTES NTD: NORMAL
NT: NORMAL
PAPP-A SERPL-ACNC: NORMAL
TRISOMY 18/3: NORMAL

## 2022-03-15 ENCOUNTER — NON-APPOINTMENT (OUTPATIENT)
Age: 37
End: 2022-03-15

## 2022-03-15 ENCOUNTER — APPOINTMENT (OUTPATIENT)
Dept: OBGYN | Facility: CLINIC | Age: 37
End: 2022-03-15
Payer: COMMERCIAL

## 2022-03-15 VITALS
SYSTOLIC BLOOD PRESSURE: 118 MMHG | BODY MASS INDEX: 20.61 KG/M2 | WEIGHT: 136 LBS | HEIGHT: 68 IN | DIASTOLIC BLOOD PRESSURE: 78 MMHG

## 2022-03-15 PROCEDURE — 0502F SUBSEQUENT PRENATAL CARE: CPT

## 2022-03-17 NOTE — OB RN PATIENT PROFILE - EDUCATION PROVIDED ON BREASTFEEDING ASSESSMENT AND INSTRUCTION; INCLUDING POSITIONING, NEWBORN ATTACHMENT, AND COMFORT
Patient is a 89y old  Female who presents with a chief complaint of Fall (17 Mar 2022 10:20)    Patient seen and examined at bedside. No acute overnight events. Reports dizziness/lightheadedness persists but with some improvement.    ALLERGIES:  LAI-2 inhibitors (Unknown)  Lipitor (Unknown)  Originally Entered as [Unknown] reaction to [RS] (Unknown)  statins (Unknown)  sulfa drugs (Unknown)  Zocor (Unknown)    MEDICATIONS  (STANDING):  apixaban 2.5 milliGRAM(s) Oral two times a day  ascorbic acid 500 milliGRAM(s) Oral daily  influenza  Vaccine (HIGH DOSE) 0.7 milliLiter(s) IntraMuscular once  lisinopril 10 milliGRAM(s) Oral daily  melatonin 3 milliGRAM(s) Oral at bedtime  multivitamin 1 Tablet(s) Oral daily  senna 2 Tablet(s) Oral at bedtime    MEDICATIONS  (PRN):  acetaminophen     Tablet .. 650 milliGRAM(s) Oral every 6 hours PRN Temp greater or equal to 38C (100.4F), Mild Pain (1 - 3)  aluminum hydroxide/magnesium hydroxide/simethicone Suspension 30 milliLiter(s) Oral every 4 hours PRN Dyspepsia  ondansetron Injectable 4 milliGRAM(s) IV Push every 8 hours PRN Nausea and/or Vomiting    Vital Signs Last 24 Hrs  T(F): 98.2 (17 Mar 2022 05:25), Max: 98.8 (16 Mar 2022 15:44)  HR: 66 (17 Mar 2022 05:25) (64 - 66)  BP: 136/68 (17 Mar 2022 05:25) (126/64 - 136/68)  RR: 17 (17 Mar 2022 05:25) (14 - 17)  SpO2: 97% (17 Mar 2022 05:25) (95% - 97%)    I&O's Summary    16 Mar 2022 07:01  -  17 Mar 2022 07:00  --------------------------------------------------------  IN: 960 mL / OUT: 0 mL / NET: 960 mL    17 Mar 2022 07:01  -  17 Mar 2022 13:24  --------------------------------------------------------  IN: 320 mL / OUT: 0 mL / NET: 320 mL      BMI (kg/m2): 26.1 (03-15-22 @ 17:33)    PHYSICAL EXAM:  General: NAD, frail older female   ENT: MMM  Neck: Supple, No JVD  Lungs: Clear to auscultation bilaterally   Cardio: RRR, S1/S2, No murmurs  Abdomen: Soft, Nontender, Nondistended; Bowel sounds present  Extremities: No calf tenderness, No pitting edema  Neuro: A/O x 3, answering questions appropriately    LABS:                        9.5    6.55  )-----------( 231      ( 17 Mar 2022 05:30 )             30.3       03-16    142  |  109  |  37  ----------------------------<  105  4.3   |  21  |  1.20    Ca    9.1      16 Mar 2022 06:00    TPro  6.7  /  Alb  x   /  TBili  x   /  DBili  x   /  AST  x   /  ALT  x   /  AlkPhos  x   03-17       PT/INR - ( 15 Mar 2022 10:30 )   PT: 13.4 sec;   INR: 1.15 ratio         PTT - ( 15 Mar 2022 10:30 )  PTT:29.4 sec     CARDIAC MARKERS ( 15 Mar 2022 11:51 )  x     / 20.5 ng/L / x     / x     / x      CARDIAC MARKERS ( 15 Mar 2022 10:30 )  x     / 20.3 ng/L / x     / x     / x            TSH 0.422   TSH with FT4 reflex --  Total T3 --                  Urinalysis Basic - ( 15 Mar 2022 06:05 )    Color: Yellow / Appearance: Clear / S.020 / pH: x  Gluc: x / Ketone: Negative  / Bili: Negative / Urobili: Negative   Blood: x / Protein: 100 / Nitrite: Negative   Leuk Esterase: Negative / RBC: 0-4 /HPF / WBC 0-2 /HPF   Sq Epi: x / Non Sq Epi: Neg.-Few / Bacteria: Negative /HPF        COVID-19 PCR: NotDetec (03-15-22 @ 10:40)      RADIOLOGY & ADDITIONAL TESTS:     Care Discussed with Consultants/Other Providers:    Patient is a 89y old  Female who presents with a chief complaint of Fall (15 Mar 2022 15:21)      24 HOUR EVENTS:  No overnight events reported.     SUBJECTIVE:  Patient seen and examined at bedside.   She is oriented to person, place, time, and situation.  She reports having constant   lightheadedness over the past several weeks. She will get momentary dizziness, and was dizzy prior to this fall. SHe caught herself and twisted her ankle.  She has been anemic since at least August when she was hospitalized for a nose bleed, requiring a blood transfusion.   She does not believe this has anything to do with a fib and is not having a fib now.     ALLERGIES:  LAI-2 inhibitors (Unknown)  Lipitor (Unknown)  Originally Entered as [Unknown] reaction to [RS] (Unknown)  statins (Unknown)  sulfa drugs (Unknown)  Zocor (Unknown)    MEDICATIONS  (STANDING):  amLODIPine   Tablet 5 milliGRAM(s) Oral daily  apixaban 2.5 milliGRAM(s) Oral two times a day  ascorbic acid 500 milliGRAM(s) Oral daily  influenza  Vaccine (HIGH DOSE) 0.7 milliLiter(s) IntraMuscular once  lisinopril 10 milliGRAM(s) Oral daily  melatonin 3 milliGRAM(s) Oral at bedtime  multivitamin 1 Tablet(s) Oral daily  senna 2 Tablet(s) Oral at bedtime    MEDICATIONS  (PRN):  acetaminophen     Tablet .. 650 milliGRAM(s) Oral every 6 hours PRN Temp greater or equal to 38C (100.4F), Mild Pain (1 - 3)  aluminum hydroxide/magnesium hydroxide/simethicone Suspension 30 milliLiter(s) Oral every 4 hours PRN Dyspepsia  meclizine 12.5 milliGRAM(s) Oral three times a day PRN Dizziness  ondansetron Injectable 4 milliGRAM(s) IV Push every 8 hours PRN Nausea and/or Vomiting  traMADol 25 milliGRAM(s) Oral every 6 hours PRN Moderate Pain (4 - 6)    Vital Signs Last 24 Hrs  T(F): 98 (16 Mar 2022 08:00), Max: 98.5 (15 Mar 2022 19:30)  HR: 95 (16 Mar 2022 05:59) (62 - 95)  BP: 137/65 (16 Mar 2022 05:59) (133/53 - 144/71)  RR: 16 (16 Mar 2022 05:59) (16 - 16)  SpO2: 97% (16 Mar 2022 08:00) (95% - 99%)  I&O's Summary    15 Mar 2022 07:01  -  16 Mar 2022 07:00  --------------------------------------------------------  IN: 0 mL / OUT: 300 mL / NET: -300 mL    16 Mar 2022 07:01  -  16 Mar 2022 11:10  --------------------------------------------------------  IN: 420 mL / OUT: 0 mL / NET: 420 mL      PHYSICAL EXAM:  General: NAD, A/O x 3  ENT: Moist mucous membranes, no thrush  Neck: Supple, No JVD  Lungs: Clear to auscultation bilaterally, good air entry, non-labored breathing  Cardio: RRR, S1/S2, No murmur  Abdomen: Soft, Nontender, Nondistended; Bowel sounds present  Extremities: no pitting edema, no jaundice, no contractures. aircast on the left foot with swelling.    LABS:                        8.9    6.99  )-----------( 204      ( 16 Mar 2022 06:00 )             27.9     03-16    142  |  109  |  37  ----------------------------<  105  4.3   |  21  |  1.20    Ca    9.1      16 Mar 2022 06:00    TPro  6.9  /  Alb  3.1  /  TBili  0.6  /  DBili  x   /  AST  20  /  ALT  23  /  AlkPhos  55  03-16          PT/INR - ( 15 Mar 2022 10:30 )   PT: 13.4 sec;   INR: 1.15 ratio         PTT - ( 15 Mar 2022 10:30 )  PTT:29.4 sec      CARDIAC MARKERS ( 15 Mar 2022 11:51 )  x     / 20.5 ng/L / x     / x     / x      CARDIAC MARKERS ( 15 Mar 2022 10:30 )  x     / 20.3 ng/L / x     / x     / x            TSH 0.422   TSH with FT4 reflex --  Total T3 --        Urinalysis Basic - ( 15 Mar 2022 06:05 )    Color: Yellow / Appearance: Clear / S.020 / pH: x  Gluc: x / Ketone: Negative  / Bili: Negative / Urobili: Negative   Blood: x / Protein: 100 / Nitrite: Negative   Leuk Esterase: Negative / RBC: 0-4 /HPF / WBC 0-2 /HPF   Sq Epi: x / Non Sq Epi: Neg.-Few / Bacteria: Negative /HPF        COVID-19 PCR: NotDetec (03-15-22 @ 10:40)    RADIOLOGY & ADDITIONAL TESTS:  < from: Xray Foot AP + Lateral + Oblique, Left (03.15.22 @ 10:54) >    IMPRESSION: Mild left ankle swelling and degeneration. No fracture.    Heart enlargement again noted as well as bilateral breast implants.   Presently there is a left-sided pacemaker.    < end of copied text >      Care Discussed with Consultants/Other Providers:   Dr. Woodard, heme  Statement Selected

## 2022-03-29 ENCOUNTER — APPOINTMENT (OUTPATIENT)
Dept: ANTEPARTUM | Facility: CLINIC | Age: 37
End: 2022-03-29
Payer: COMMERCIAL

## 2022-03-29 PROCEDURE — 36415 COLL VENOUS BLD VENIPUNCTURE: CPT

## 2022-04-01 LAB
1ST TRIMESTER DATA: NORMAL
2ND TRIMESTER DATA: NORMAL
AFP PNL SERPL: NORMAL
AFP SERPL-ACNC: NORMAL
AFP SERPL-ACNC: NORMAL
B-HCG FREE SERPL-MCNC: NORMAL
CLINICAL BIOCHEMIST REVIEW: NORMAL
FREE BETA HCG 1ST TRIMESTER: NORMAL
INHIBIN A SERPL-MCNC: NORMAL
NASAL BONE: PRESENT
NOTES NTD: NORMAL
NT: NORMAL
PAPP-A SERPL-ACNC: NORMAL
U ESTRIOL SERPL-SCNC: NORMAL

## 2022-04-12 ENCOUNTER — NON-APPOINTMENT (OUTPATIENT)
Age: 37
End: 2022-04-12

## 2022-04-12 ENCOUNTER — APPOINTMENT (OUTPATIENT)
Dept: OBGYN | Facility: CLINIC | Age: 37
End: 2022-04-12
Payer: COMMERCIAL

## 2022-04-12 VITALS
WEIGHT: 139 LBS | SYSTOLIC BLOOD PRESSURE: 120 MMHG | BODY MASS INDEX: 21.07 KG/M2 | HEIGHT: 68 IN | DIASTOLIC BLOOD PRESSURE: 74 MMHG

## 2022-04-12 PROCEDURE — 0502F SUBSEQUENT PRENATAL CARE: CPT

## 2022-04-26 ENCOUNTER — ASOB RESULT (OUTPATIENT)
Age: 37
End: 2022-04-26

## 2022-04-26 ENCOUNTER — APPOINTMENT (OUTPATIENT)
Dept: ANTEPARTUM | Facility: CLINIC | Age: 37
End: 2022-04-26
Payer: COMMERCIAL

## 2022-04-26 PROCEDURE — 76811 OB US DETAILED SNGL FETUS: CPT

## 2022-04-26 PROCEDURE — 76817 TRANSVAGINAL US OBSTETRIC: CPT

## 2022-05-10 ENCOUNTER — NON-APPOINTMENT (OUTPATIENT)
Age: 37
End: 2022-05-10

## 2022-05-10 ENCOUNTER — APPOINTMENT (OUTPATIENT)
Dept: OBGYN | Facility: CLINIC | Age: 37
End: 2022-05-10
Payer: COMMERCIAL

## 2022-05-10 VITALS
WEIGHT: 143 LBS | HEIGHT: 68 IN | DIASTOLIC BLOOD PRESSURE: 80 MMHG | BODY MASS INDEX: 21.67 KG/M2 | SYSTOLIC BLOOD PRESSURE: 122 MMHG

## 2022-05-10 PROCEDURE — 0502F SUBSEQUENT PRENATAL CARE: CPT

## 2022-06-11 ENCOUNTER — LABORATORY RESULT (OUTPATIENT)
Age: 37
End: 2022-06-11

## 2022-06-14 ENCOUNTER — APPOINTMENT (OUTPATIENT)
Dept: OBGYN | Facility: CLINIC | Age: 37
End: 2022-06-14
Payer: COMMERCIAL

## 2022-06-14 VITALS
WEIGHT: 145 LBS | DIASTOLIC BLOOD PRESSURE: 70 MMHG | SYSTOLIC BLOOD PRESSURE: 110 MMHG | BODY MASS INDEX: 21.98 KG/M2 | HEIGHT: 68 IN

## 2022-06-14 PROCEDURE — 0502F SUBSEQUENT PRENATAL CARE: CPT

## 2022-06-21 ENCOUNTER — APPOINTMENT (OUTPATIENT)
Dept: ANTEPARTUM | Facility: CLINIC | Age: 37
End: 2022-06-21

## 2022-06-22 ENCOUNTER — NON-APPOINTMENT (OUTPATIENT)
Age: 37
End: 2022-06-22

## 2022-06-22 LAB
GLUCOSE 1H P 100 G GLC PO SERPL-MCNC: 182 MG/DL
GLUCOSE 2H P CHAL SERPL-MCNC: 115 MG/DL
GLUCOSE 3H P CHAL SERPL-MCNC: 79 MG/DL
GLUCOSE BS SERPL-MCNC: 71 MG/DL

## 2022-06-28 ENCOUNTER — APPOINTMENT (OUTPATIENT)
Dept: MATERNAL FETAL MEDICINE | Facility: CLINIC | Age: 37
End: 2022-06-28

## 2022-06-28 ENCOUNTER — ASOB RESULT (OUTPATIENT)
Age: 37
End: 2022-06-28

## 2022-06-28 VITALS — BODY MASS INDEX: 22.13 KG/M2 | WEIGHT: 146 LBS | HEIGHT: 68 IN

## 2022-06-28 PROCEDURE — G0108 DIAB MANAGE TRN  PER INDIV: CPT | Mod: 95

## 2022-07-12 ENCOUNTER — APPOINTMENT (OUTPATIENT)
Dept: ANTEPARTUM | Facility: CLINIC | Age: 37
End: 2022-07-12

## 2022-07-12 ENCOUNTER — ASOB RESULT (OUTPATIENT)
Age: 37
End: 2022-07-12

## 2022-07-12 ENCOUNTER — APPOINTMENT (OUTPATIENT)
Dept: OBGYN | Facility: CLINIC | Age: 37
End: 2022-07-12

## 2022-07-12 VITALS
DIASTOLIC BLOOD PRESSURE: 68 MMHG | SYSTOLIC BLOOD PRESSURE: 118 MMHG | WEIGHT: 143 LBS | HEIGHT: 67 IN | BODY MASS INDEX: 22.44 KG/M2

## 2022-07-12 PROCEDURE — 0502F SUBSEQUENT PRENATAL CARE: CPT

## 2022-07-12 PROCEDURE — 76816 OB US FOLLOW-UP PER FETUS: CPT

## 2022-07-14 ENCOUNTER — APPOINTMENT (OUTPATIENT)
Dept: MATERNAL FETAL MEDICINE | Facility: CLINIC | Age: 37
End: 2022-07-14

## 2022-07-14 ENCOUNTER — ASOB RESULT (OUTPATIENT)
Age: 37
End: 2022-07-14

## 2022-07-14 VITALS — HEIGHT: 67 IN | BODY MASS INDEX: 23.23 KG/M2 | WEIGHT: 148 LBS

## 2022-07-14 PROCEDURE — G0108 DIAB MANAGE TRN  PER INDIV: CPT | Mod: 95

## 2022-07-25 ENCOUNTER — NON-APPOINTMENT (OUTPATIENT)
Age: 37
End: 2022-07-25

## 2022-07-25 ENCOUNTER — APPOINTMENT (OUTPATIENT)
Dept: OBGYN | Facility: CLINIC | Age: 37
End: 2022-07-25

## 2022-07-25 VITALS
BODY MASS INDEX: 23.07 KG/M2 | SYSTOLIC BLOOD PRESSURE: 118 MMHG | DIASTOLIC BLOOD PRESSURE: 77 MMHG | HEIGHT: 67 IN | WEIGHT: 147 LBS

## 2022-07-25 DIAGNOSIS — Z34.92 ENCOUNTER FOR SUPERVISION OF NORMAL PREGNANCY, UNSPECIFIED, SECOND TRIMESTER: ICD-10-CM

## 2022-07-25 PROCEDURE — 90715 TDAP VACCINE 7 YRS/> IM: CPT

## 2022-07-25 PROCEDURE — 0502F SUBSEQUENT PRENATAL CARE: CPT

## 2022-07-25 PROCEDURE — 90471 IMMUNIZATION ADMIN: CPT

## 2022-08-04 ENCOUNTER — NON-APPOINTMENT (OUTPATIENT)
Age: 37
End: 2022-08-04

## 2022-08-08 ENCOUNTER — NON-APPOINTMENT (OUTPATIENT)
Age: 37
End: 2022-08-08

## 2022-08-09 ENCOUNTER — APPOINTMENT (OUTPATIENT)
Dept: OBGYN | Facility: CLINIC | Age: 37
End: 2022-08-09

## 2022-08-09 ENCOUNTER — ASOB RESULT (OUTPATIENT)
Age: 37
End: 2022-08-09

## 2022-08-09 ENCOUNTER — APPOINTMENT (OUTPATIENT)
Dept: ANTEPARTUM | Facility: CLINIC | Age: 37
End: 2022-08-09

## 2022-08-09 VITALS
SYSTOLIC BLOOD PRESSURE: 110 MMHG | WEIGHT: 147 LBS | BODY MASS INDEX: 23.07 KG/M2 | DIASTOLIC BLOOD PRESSURE: 70 MMHG | HEIGHT: 67 IN

## 2022-08-09 PROCEDURE — 0502F SUBSEQUENT PRENATAL CARE: CPT

## 2022-08-09 PROCEDURE — 76816 OB US FOLLOW-UP PER FETUS: CPT

## 2022-08-11 ENCOUNTER — APPOINTMENT (OUTPATIENT)
Dept: MATERNAL FETAL MEDICINE | Facility: CLINIC | Age: 37
End: 2022-08-11

## 2022-08-12 ENCOUNTER — NON-APPOINTMENT (OUTPATIENT)
Age: 37
End: 2022-08-12

## 2022-08-16 ENCOUNTER — ASOB RESULT (OUTPATIENT)
Age: 37
End: 2022-08-16

## 2022-08-16 ENCOUNTER — APPOINTMENT (OUTPATIENT)
Dept: ANTEPARTUM | Facility: CLINIC | Age: 37
End: 2022-08-16

## 2022-08-16 ENCOUNTER — APPOINTMENT (OUTPATIENT)
Dept: OBGYN | Facility: CLINIC | Age: 37
End: 2022-08-16

## 2022-08-16 ENCOUNTER — APPOINTMENT (OUTPATIENT)
Dept: MATERNAL FETAL MEDICINE | Facility: CLINIC | Age: 37
End: 2022-08-16

## 2022-08-16 ENCOUNTER — NON-APPOINTMENT (OUTPATIENT)
Age: 37
End: 2022-08-16

## 2022-08-16 VITALS
DIASTOLIC BLOOD PRESSURE: 82 MMHG | SYSTOLIC BLOOD PRESSURE: 120 MMHG | BODY MASS INDEX: 23.07 KG/M2 | WEIGHT: 147 LBS | HEART RATE: 112 BPM | HEIGHT: 67 IN

## 2022-08-16 PROCEDURE — 59025 FETAL NON-STRESS TEST: CPT

## 2022-08-16 PROCEDURE — G0108 DIAB MANAGE TRN  PER INDIV: CPT | Mod: 95

## 2022-08-16 PROCEDURE — 76819 FETAL BIOPHYS PROFIL W/O NST: CPT

## 2022-08-16 PROCEDURE — 0502F SUBSEQUENT PRENATAL CARE: CPT

## 2022-08-18 ENCOUNTER — NON-APPOINTMENT (OUTPATIENT)
Age: 37
End: 2022-08-18

## 2022-08-19 LAB — B-HEM STREP SPEC QL CULT: NORMAL

## 2022-08-21 ENCOUNTER — NON-APPOINTMENT (OUTPATIENT)
Age: 37
End: 2022-08-21

## 2022-08-21 LAB
BASOPHILS # BLD AUTO: 0.04 K/UL
BASOPHILS NFR BLD AUTO: 0.6 %
EOSINOPHIL # BLD AUTO: 0.08 K/UL
EOSINOPHIL NFR BLD AUTO: 1.2 %
HCT VFR BLD CALC: 39.1 %
HGB BLD-MCNC: 12 G/DL
HIV1+2 AB SPEC QL IA.RAPID: NONREACTIVE
IMM GRANULOCYTES NFR BLD AUTO: 0.8 %
LYMPHOCYTES # BLD AUTO: 1.99 K/UL
LYMPHOCYTES NFR BLD AUTO: 30.5 %
MAN DIFF?: NORMAL
MCHC RBC-ENTMCNC: 30.2 PG
MCHC RBC-ENTMCNC: 30.7 GM/DL
MCV RBC AUTO: 98.2 FL
MONOCYTES # BLD AUTO: 0.62 K/UL
MONOCYTES NFR BLD AUTO: 9.5 %
NEUTROPHILS # BLD AUTO: 3.75 K/UL
NEUTROPHILS NFR BLD AUTO: 57.4 %
PLATELET # BLD AUTO: 290 K/UL
RBC # BLD: 3.98 M/UL
RBC # FLD: 14.2 %
T PALLIDUM AB SER QL IA: NEGATIVE
WBC # FLD AUTO: 6.53 K/UL

## 2022-08-22 VITALS — BODY MASS INDEX: 23.07 KG/M2 | HEIGHT: 67 IN | WEIGHT: 147 LBS

## 2022-08-22 RX ORDER — MULTIVIT-MIN/FOLIC/VIT K/LYCOP 400-300MCG
28-0.8 TABLET ORAL
Refills: 0 | Status: ACTIVE | COMMUNITY

## 2022-08-23 ENCOUNTER — NON-APPOINTMENT (OUTPATIENT)
Age: 37
End: 2022-08-23

## 2022-08-23 ENCOUNTER — APPOINTMENT (OUTPATIENT)
Dept: MATERNAL FETAL MEDICINE | Facility: CLINIC | Age: 37
End: 2022-08-23

## 2022-08-23 NOTE — DISCUSSION/SUMMARY
[FreeTextEntry1] : The patient is a 37-year-old -0-0-1 having telehealth consultation after verbal consent given for advanced maternal age and gestational diabetes and poor maternal weight gain.\par \par Her obstetrical history is significant for delivery in  of a liveborn male  weighing 7 pounds 2 ounces delivered at term via normal spontaneous vaginal delivery.  No problems or complications noted with that pregnancy\par \par Evaluation the patient's diabetic flowsheets demonstrates good control of fasting and postprandial blood sugars.  Her most recent growth scan was performed on  and revealed a single viable intrauterine gestation with the estimated fetal weight of 6 pounds 7 ounces which is consistent with the 72nd percentile.  Vertex presentation with an anterior placenta was seen and the amniotic fluid index was normal at 15.45 cm.  Vital signs on  revealed a blood pressure of 120/82 and maternal weight was 147 pounds consistent with a BMI of 23.02 kg.\par \par Gestational diabetes;\par \par Dietary consultation has been performed and a report was sent under separate cover.  Evaluation of the patient's diabetic flowsheets demonstrates good control of fasting and postprandial blood sugars.  Many of her postprandial blood sugars are below 100.  At this time medical intervention is not recommended.  The patient was advised to increase her carbohydrate intake. We would prefer to see her postprandial blood sugars greater than 100.  Weekly  testing until delivery is recommended.  Delivery on or around the patient's EDC is also recommended.  Problems and complications related to a diabetic pregnancy including fetal macrosomia, shoulder dystocia with associated morbidity mortality and increased risk for  intensive care admission were discussed.  All the above was discussed with the patient, all of her questions were answered.\par \par COVID-19 vaccination;\par \par COVID-19 vaccination in the pregnancy was discussed.  We advise pregnant patients to be vaccinated.  Pregnant patient are at greater risk for problems or complications with a COVID infection.  The patient has completed her initial vaccination with the Pfizer vaccine.  Risks and benefits of booster vaccination during pregnancy were discussed and after all the patient's questions were answered the patient has declined booster vaccination.  She will call your office should she have COVID to discuss treatment options.\par \par She has a noncontributory family, medical and surgical history.  She has no known allergies to medications and denies alcohol, tobacco or drug use.\par \par I spent a total of 27 minutes using audio and visual technologies with the patient located in her home and done from our [facility] office reviewing the patient's prenatal record, prenatal blood work, outside medical records, previous consultations and ultrasound ports counseling and coordinating care.\par \par Recommendations;\par \par 1.  Continue current ADA diet and home glucose monitoring.\par 2.  Weekly  testing until delivery.\par 3.  Delivery on around the patient's EDC is recommended.\par 4.  Increase carbohydrate intake so postprandial blood sugars are > 100.

## 2022-08-24 ENCOUNTER — APPOINTMENT (OUTPATIENT)
Dept: OBGYN | Facility: CLINIC | Age: 37
End: 2022-08-24

## 2022-08-24 ENCOUNTER — APPOINTMENT (OUTPATIENT)
Dept: ANTEPARTUM | Facility: CLINIC | Age: 37
End: 2022-08-24

## 2022-08-24 ENCOUNTER — ASOB RESULT (OUTPATIENT)
Age: 37
End: 2022-08-24

## 2022-08-24 VITALS
WEIGHT: 147 LBS | BODY MASS INDEX: 23.07 KG/M2 | DIASTOLIC BLOOD PRESSURE: 70 MMHG | HEIGHT: 67 IN | SYSTOLIC BLOOD PRESSURE: 120 MMHG

## 2022-08-24 PROCEDURE — 76819 FETAL BIOPHYS PROFIL W/O NST: CPT

## 2022-08-24 PROCEDURE — 59025 FETAL NON-STRESS TEST: CPT | Mod: 59

## 2022-08-24 PROCEDURE — 0502F SUBSEQUENT PRENATAL CARE: CPT

## 2022-08-30 ENCOUNTER — APPOINTMENT (OUTPATIENT)
Dept: OBGYN | Facility: CLINIC | Age: 37
End: 2022-08-30

## 2022-08-30 ENCOUNTER — ASOB RESULT (OUTPATIENT)
Age: 37
End: 2022-08-30

## 2022-08-30 ENCOUNTER — APPOINTMENT (OUTPATIENT)
Dept: ANTEPARTUM | Facility: CLINIC | Age: 37
End: 2022-08-30

## 2022-08-30 VITALS
HEART RATE: 103 BPM | BODY MASS INDEX: 23.07 KG/M2 | SYSTOLIC BLOOD PRESSURE: 123 MMHG | HEIGHT: 67 IN | WEIGHT: 147 LBS | DIASTOLIC BLOOD PRESSURE: 85 MMHG

## 2022-08-30 PROBLEM — D21.9 BENIGN NEOPLASM OF CONNECTIVE AND OTHER SOFT TISSUE, UNSPECIFIED: Chronic | Status: ACTIVE | Noted: 2022-08-12

## 2022-08-30 PROCEDURE — 76819 FETAL BIOPHYS PROFIL W/O NST: CPT

## 2022-08-30 PROCEDURE — 59025 FETAL NON-STRESS TEST: CPT | Mod: 59

## 2022-08-30 PROCEDURE — 0502F SUBSEQUENT PRENATAL CARE: CPT

## 2022-09-02 ENCOUNTER — NON-APPOINTMENT (OUTPATIENT)
Age: 37
End: 2022-09-02

## 2022-09-06 NOTE — OB PROVIDER H&P - NSICDXPASTMEDICALHX_GEN_ALL_CORE_FT
PAST MEDICAL HISTORY:  COVID-19 5/2022 fever congestion cough bodyaches no hosp    Fibroids     HTN (hypertension) controlled    Obesity (BMI 30-39.9)     Thyroid cancer 2013     PAST MEDICAL HISTORY:  Fibroids     Thyroid cancer 2013     PAST MEDICAL HISTORY:  Fibroids

## 2022-09-06 NOTE — OB PROVIDER H&P - NSHPPHYSICALEXAM_GEN_ALL_CORE
Vital Signs Last 24 Hrs  T(C): 37.1 (07 Sep 2022 20:22), Max: 37.1 (07 Sep 2022 20:22)  T(F): 98.8 (07 Sep 2022 20:22), Max: 98.8 (07 Sep 2022 20:22)  HR: 91 (07 Sep 2022 20:22) (91 - 91)  BP: 136/84 (07 Sep 2022 20:22) (136/84 - 136/84)  BP(mean): --  RR: 18 (07 Sep 2022 20:22) (18 - 18)  SpO2: --    Parameters below as of 07 Sep 2022 20:22  Patient On (Oxygen Delivery Method): room air    FHT: baseline 130, moderate variability, + accels, - decels  [place]: ctx q3-5min    Gen: NAD, AOx4  CV: RRR  Pulm: CTAB  Abd: soft, gravid, nontender, nondistended, mild contractions palpated  SVE: 2-3/50/-3  Ext: no edema, erythema or tenderness

## 2022-09-06 NOTE — OB PROVIDER H&P - NSICDXPASTSURGICALHX_GEN_ALL_CORE_FT
PAST SURGICAL HISTORY:  History of lumpectomy 1991    History of total thyroidectomy 2013     PAST SURGICAL HISTORY:  No significant past surgical history PAST SURGICAL HISTORY:  No significant past surgical history

## 2022-09-06 NOTE — OB PROVIDER H&P - ASSESSMENT
36yo  at 39w5d by LMP 12/3/21 c/w 1st trimester sono presenting for induction of labor    Admit to L&D  Vital Signs per Unit Protocol  Admission Laboratory Panel  IVF LR @ 125cc/hr  Continuous Fetal Cardiotocography  GBS PPx, if indicated  Cytotec 25 MCG PV Q4 x1, followed by Pitocin Augmentation with AROM PRN  Pain management at Pt Request    Addendum:    Subjective Hx and Physical Exam reviewed.  I agree with the Resident Physician's assessment and plan of care, as discussed above.  R/B/A of admission for labor management, vaginal delivery with possible  section discussed at length, including medications and options for pain management.  She has no Pentecostal or personal objection to blood transfusion, if necessary.  She was given the opportunity to ask questions and all were addressed.  She understands the plan of care.    Josh Walker, DO   38yo  at 39w5d by LMP 12/3/21 c/w 1st trimester sono presenting for induction of labor    Admit to L&D  Vital Signs per Unit Protocol  Admission Laboratory Panel  IVF LR @ 125cc/hr  Continuous Fetal Cardiotocography  GBS PPx, if indicated  Pitocin Induction / Augmentation with AROM PRN  Pain management at Pt Request    Addendum:    Subjective Hx and Physical Exam reviewed.  I agree with the Resident Physician's assessment and plan of care, as discussed above.  R/B/A of admission for labor management, vaginal delivery with possible  section discussed at length, including medications and options for pain management.  She has no Yazidism or personal objection to blood transfusion, if necessary.  She was given the opportunity to ask questions and all were addressed.  She understands the plan of care.    Josh Walker, DO   36yo  at 39w5d by LMP 12/3/21 c/w 1st trimester sono presenting for induction of labor    Admit to L&D  Vital Signs per Unit Protocol  Admission Laboratory Panel  IVF LR @ 125cc/hr  Continuous Fetal Cardiotocography  GBS neg, no need for antibiotics at this time  Pitocin Induction. Plan to AROM with next exam  Pain management at Pt Request    Addendum:    Subjective Hx and Physical Exam reviewed.  I agree with the Resident Physician's assessment and plan of care, as discussed above.  R/B/A of admission for labor management, vaginal delivery with possible  section discussed at length, including medications and options for pain management.  She has no Christian or personal objection to blood transfusion, if necessary.  She was given the opportunity to ask questions and all were addressed.  She understands the plan of care.    Josh Walker, DO

## 2022-09-06 NOTE — OB PROVIDER H&P - HISTORY OF PRESENT ILLNESS
NICOLA TOLEDO is a 36yo  at 39w5d (dated by LMP 12/3/21 c/w 1st trimester sono presenting for eIOL.     Denies leakage of fluids, vaginal bleeding, painful contractions. Reports active fetal movement.  Denies fever, chills, nausea, vomiting.  Denies HA, RUQ pain, vision changes, increased leg swelling.    PNC @ [facility]:  1. Glucose intolerance. GCT positive, GTT with elevated 1h glucose (182), but negative 2h and 3h. Monitoring of fasting and postprandial glucose shows well controlled blood sugars  2. AMA  3. Fibroid uterus  4. Marginal insertion of umbilical cord    OBhx:   2019: FT , uncomplicated, 8lbs  Gyn: + fibroids  PMH: denies  PSH: denies  Med: PNV, ASA  Allergies: NKDA    General: AAOx3, NAD  Heart: RRR  Lungs: CTAB  Abd: Soft, nontender, gravid  SVE: *** /*** /***    FHT: ***bpm, category I tracing.  [place]: Ctxs every *** minutes.     Sono:       NICOLA TOLEDO is a 36yo  at 39w5d (dated by LMP 12/3/21 c/w 1st trimester sono presenting for eIOL.   - Consent  - Admission labs  - GBS ***, will ***  - **pertinent med/surg/lab/sono hx**  - Continuous toco/FHT  - Maternal/fetal status reassuring  - Admit to L&D    D/w ***.     NICOLA TOLEDO is a 36yo  at 39w5d by LMP 12/3/21 c/w 1st trimester sono presenting for induction of labor     Denies leakage of fluids, vaginal bleeding, painful contractions. Reports active fetal movement.  Denies fever, chills, nausea, vomiting.  Denies HA, RUQ pain, vision changes, increased leg swelling.    PNC @ [facility]:  1. Glucose intolerance. GCT positive, GTT with elevated 1h glucose (182), but negative 2h and 3h. Monitoring of fasting and postprandial glucose shows well controlled blood sugars  2. AMA  3. Fibroid uterus  4. Marginal insertion of umbilical cord    OBhx:   2019: FT , uncomplicated, 8lbs  Gyn: + fibroids  PMH: denies  PSH: denies  Med: PNV, ASA  Allergies: NKDA    General: AAOx3, NAD  Heart: RRR  Lungs: CTAB  Abd: Soft, nontender, gravid  SVE: *** /*** /***    FHT: ***bpm, category I tracing.  [place]: Ctxs every *** minutes.     Sono:            NICOLA TOLEDO is a 36yo  at 39w5d by LMP 12/3/21 c/w 1st trimester sono presenting for induction of labor     Denies leakage of fluids, vaginal bleeding, painful contractions. Reports active fetal movement.  Denies fever, chills, nausea, vomiting.  Denies HA, RUQ pain, vision changes, increased leg swelling.    PNC @ [facility]:  1. Glucose intolerance. GCT positive, GTT with elevated 1h glucose (182), but negative 2h and 3h. Monitoring of fasting and postprandial glucose shows well controlled blood sugars  2. AMA  3. Fibroid uterus  4. Marginal insertion of umbilical cord    OBhx:   2019: FT , uncomplicated, 8lbs  Gyn: + fibroids  PMH: denies  PSH: denies  Med: PNV, ASA  Allergies: NKDA

## 2022-09-07 ENCOUNTER — APPOINTMENT (OUTPATIENT)
Dept: ANTEPARTUM | Facility: CLINIC | Age: 37
End: 2022-09-07

## 2022-09-07 ENCOUNTER — NON-APPOINTMENT (OUTPATIENT)
Age: 37
End: 2022-09-07

## 2022-09-07 ENCOUNTER — APPOINTMENT (OUTPATIENT)
Dept: OBGYN | Facility: CLINIC | Age: 37
End: 2022-09-07

## 2022-09-07 ENCOUNTER — INPATIENT (INPATIENT)
Facility: HOSPITAL | Age: 37
LOS: 1 days | Discharge: HOME | End: 2022-09-09
Attending: OBSTETRICS & GYNECOLOGY | Admitting: OBSTETRICS & GYNECOLOGY
Payer: COMMERCIAL

## 2022-09-07 VITALS — DIASTOLIC BLOOD PRESSURE: 84 MMHG | HEART RATE: 91 BPM | SYSTOLIC BLOOD PRESSURE: 136 MMHG

## 2022-09-07 DIAGNOSIS — Z98.890 OTHER SPECIFIED POSTPROCEDURAL STATES: Chronic | ICD-10-CM

## 2022-09-07 DIAGNOSIS — E89.0 POSTPROCEDURAL HYPOTHYROIDISM: Chronic | ICD-10-CM

## 2022-09-07 DIAGNOSIS — Z3A.39 39 WEEKS GESTATION OF PREGNANCY: ICD-10-CM

## 2022-09-07 LAB
BASOPHILS # BLD AUTO: 0.04 K/UL — SIGNIFICANT CHANGE UP (ref 0–0.2)
BASOPHILS NFR BLD AUTO: 0.4 % — SIGNIFICANT CHANGE UP (ref 0–2)
BLD GP AB SCN SERPL QL: SIGNIFICANT CHANGE UP
EOSINOPHIL # BLD AUTO: 0.05 K/UL — SIGNIFICANT CHANGE UP (ref 0–0.5)
EOSINOPHIL NFR BLD AUTO: 0.5 % — SIGNIFICANT CHANGE UP (ref 0–6)
GLUCOSE BLDC GLUCOMTR-MCNC: 73 MG/DL — SIGNIFICANT CHANGE UP (ref 70–99)
HCT VFR BLD CALC: 37.8 % — SIGNIFICANT CHANGE UP (ref 34.5–45)
HGB BLD-MCNC: 12.8 G/DL — SIGNIFICANT CHANGE UP (ref 11.5–15.5)
IMM GRANULOCYTES NFR BLD AUTO: 0.5 % — SIGNIFICANT CHANGE UP (ref 0–1.5)
LYMPHOCYTES # BLD AUTO: 2.55 K/UL — SIGNIFICANT CHANGE UP (ref 1–3.3)
LYMPHOCYTES # BLD AUTO: 27 % — SIGNIFICANT CHANGE UP (ref 13–44)
MCHC RBC-ENTMCNC: 29.6 PG — SIGNIFICANT CHANGE UP (ref 27–34)
MCHC RBC-ENTMCNC: 33.9 GM/DL — SIGNIFICANT CHANGE UP (ref 32–36)
MCV RBC AUTO: 87.3 FL — SIGNIFICANT CHANGE UP (ref 80–100)
MONOCYTES # BLD AUTO: 0.76 K/UL — SIGNIFICANT CHANGE UP (ref 0–0.9)
MONOCYTES NFR BLD AUTO: 8 % — SIGNIFICANT CHANGE UP (ref 2–14)
NEUTROPHILS # BLD AUTO: 6.01 K/UL — SIGNIFICANT CHANGE UP (ref 1.8–7.4)
NEUTROPHILS NFR BLD AUTO: 63.6 % — SIGNIFICANT CHANGE UP (ref 43–77)
PLATELET # BLD AUTO: 309 K/UL — SIGNIFICANT CHANGE UP (ref 150–400)
RBC # BLD: 4.33 M/UL — SIGNIFICANT CHANGE UP (ref 3.8–5.2)
RBC # FLD: 13.2 % — SIGNIFICANT CHANGE UP (ref 10.3–14.5)
WBC # BLD: 9.46 K/UL — SIGNIFICANT CHANGE UP (ref 3.8–10.5)
WBC # FLD AUTO: 9.46 K/UL — SIGNIFICANT CHANGE UP (ref 3.8–10.5)

## 2022-09-07 RX ORDER — SODIUM CHLORIDE 9 MG/ML
1000 INJECTION, SOLUTION INTRAVENOUS
Refills: 0 | Status: DISCONTINUED | OUTPATIENT
Start: 2022-09-07 | End: 2022-09-08

## 2022-09-07 RX ORDER — CITRIC ACID/SODIUM CITRATE 300-500 MG
30 SOLUTION, ORAL ORAL ONCE
Refills: 0 | Status: DISCONTINUED | OUTPATIENT
Start: 2022-09-07 | End: 2022-09-08

## 2022-09-07 RX ORDER — OXYTOCIN 10 UNIT/ML
VIAL (ML) INJECTION
Qty: 30 | Refills: 0 | Status: DISCONTINUED | OUTPATIENT
Start: 2022-09-07 | End: 2022-09-08

## 2022-09-07 RX ORDER — OXYTOCIN 10 UNIT/ML
333.33 VIAL (ML) INJECTION
Qty: 20 | Refills: 0 | Status: COMPLETED | OUTPATIENT
Start: 2022-09-07 | End: 2022-09-08

## 2022-09-07 RX ORDER — SODIUM CHLORIDE 9 MG/ML
1000 INJECTION, SOLUTION INTRAVENOUS ONCE
Refills: 0 | Status: COMPLETED | OUTPATIENT
Start: 2022-09-07 | End: 2022-09-07

## 2022-09-07 RX ADMIN — SODIUM CHLORIDE 125 MILLILITER(S): 9 INJECTION, SOLUTION INTRAVENOUS at 21:07

## 2022-09-07 RX ADMIN — Medication 2 MILLIUNIT(S)/MIN: at 21:07

## 2022-09-07 RX ADMIN — SODIUM CHLORIDE 1000 MILLILITER(S): 9 INJECTION, SOLUTION INTRAVENOUS at 23:37

## 2022-09-07 NOTE — OB RN PATIENT PROFILE - NSICDXPASTMEDICALHX_GEN_ALL_CORE_FT
PAST MEDICAL HISTORY:  COVID-19 5/2022 fever congestion cough bodyaches no hosp    Fibroids     HTN (hypertension) controlled    Obesity (BMI 30-39.9)     Thyroid cancer 2013

## 2022-09-08 ENCOUNTER — TRANSCRIPTION ENCOUNTER (OUTPATIENT)
Age: 37
End: 2022-09-08

## 2022-09-08 LAB
COVID-19 SPIKE DOMAIN AB INTERP: POSITIVE
COVID-19 SPIKE DOMAIN ANTIBODY RESULT: >250 U/ML — HIGH
HCT VFR BLD CALC: 36 % — SIGNIFICANT CHANGE UP (ref 34.5–45)
HGB BLD-MCNC: 12 G/DL — SIGNIFICANT CHANGE UP (ref 11.5–15.5)
SARS-COV-2 IGG+IGM SERPL QL IA: >250 U/ML — HIGH
SARS-COV-2 IGG+IGM SERPL QL IA: POSITIVE
SARS-COV-2 RNA SPEC QL NAA+PROBE: SIGNIFICANT CHANGE UP
T PALLIDUM AB TITR SER: NEGATIVE — SIGNIFICANT CHANGE UP

## 2022-09-08 PROCEDURE — 59409 OBSTETRICAL CARE: CPT

## 2022-09-08 PROCEDURE — 59400 OBSTETRICAL CARE: CPT

## 2022-09-08 RX ORDER — IBUPROFEN 200 MG
600 TABLET ORAL EVERY 6 HOURS
Refills: 0 | Status: DISCONTINUED | OUTPATIENT
Start: 2022-09-08 | End: 2022-09-09

## 2022-09-08 RX ORDER — OXYTOCIN 10 UNIT/ML
333.33 VIAL (ML) INJECTION
Qty: 20 | Refills: 0 | Status: DISCONTINUED | OUTPATIENT
Start: 2022-09-08 | End: 2022-09-09

## 2022-09-08 RX ORDER — DIPHENHYDRAMINE HCL 50 MG
25 CAPSULE ORAL EVERY 6 HOURS
Refills: 0 | Status: DISCONTINUED | OUTPATIENT
Start: 2022-09-08 | End: 2022-09-09

## 2022-09-08 RX ORDER — OXYCODONE HYDROCHLORIDE 5 MG/1
5 TABLET ORAL ONCE
Refills: 0 | Status: DISCONTINUED | OUTPATIENT
Start: 2022-09-08 | End: 2022-09-09

## 2022-09-08 RX ORDER — TETANUS TOXOID, REDUCED DIPHTHERIA TOXOID AND ACELLULAR PERTUSSIS VACCINE, ADSORBED 5; 2.5; 8; 8; 2.5 [IU]/.5ML; [IU]/.5ML; UG/.5ML; UG/.5ML; UG/.5ML
0.5 SUSPENSION INTRAMUSCULAR ONCE
Refills: 0 | Status: DISCONTINUED | OUTPATIENT
Start: 2022-09-08 | End: 2022-09-09

## 2022-09-08 RX ORDER — PRAMOXINE HYDROCHLORIDE 150 MG/15G
1 AEROSOL, FOAM RECTAL EVERY 4 HOURS
Refills: 0 | Status: DISCONTINUED | OUTPATIENT
Start: 2022-09-08 | End: 2022-09-09

## 2022-09-08 RX ORDER — SIMETHICONE 80 MG/1
80 TABLET, CHEWABLE ORAL EVERY 4 HOURS
Refills: 0 | Status: DISCONTINUED | OUTPATIENT
Start: 2022-09-08 | End: 2022-09-09

## 2022-09-08 RX ORDER — MAGNESIUM HYDROXIDE 400 MG/1
30 TABLET, CHEWABLE ORAL
Refills: 0 | Status: DISCONTINUED | OUTPATIENT
Start: 2022-09-08 | End: 2022-09-09

## 2022-09-08 RX ORDER — HYDROCORTISONE 1 %
1 OINTMENT (GRAM) TOPICAL EVERY 6 HOURS
Refills: 0 | Status: DISCONTINUED | OUTPATIENT
Start: 2022-09-08 | End: 2022-09-09

## 2022-09-08 RX ORDER — ACETAMINOPHEN 500 MG
975 TABLET ORAL
Refills: 0 | Status: DISCONTINUED | OUTPATIENT
Start: 2022-09-08 | End: 2022-09-09

## 2022-09-08 RX ORDER — OXYCODONE HYDROCHLORIDE 5 MG/1
5 TABLET ORAL
Refills: 0 | Status: DISCONTINUED | OUTPATIENT
Start: 2022-09-08 | End: 2022-09-09

## 2022-09-08 RX ORDER — SODIUM CHLORIDE 9 MG/ML
3 INJECTION INTRAMUSCULAR; INTRAVENOUS; SUBCUTANEOUS EVERY 8 HOURS
Refills: 0 | Status: DISCONTINUED | OUTPATIENT
Start: 2022-09-08 | End: 2022-09-09

## 2022-09-08 RX ORDER — LANOLIN
1 OINTMENT (GRAM) TOPICAL EVERY 6 HOURS
Refills: 0 | Status: DISCONTINUED | OUTPATIENT
Start: 2022-09-08 | End: 2022-09-09

## 2022-09-08 RX ORDER — KETOROLAC TROMETHAMINE 30 MG/ML
30 SYRINGE (ML) INJECTION ONCE
Refills: 0 | Status: DISCONTINUED | OUTPATIENT
Start: 2022-09-08 | End: 2022-09-08

## 2022-09-08 RX ORDER — AER TRAVELER 0.5 G/1
1 SOLUTION RECTAL; TOPICAL EVERY 4 HOURS
Refills: 0 | Status: DISCONTINUED | OUTPATIENT
Start: 2022-09-08 | End: 2022-09-09

## 2022-09-08 RX ORDER — IBUPROFEN 200 MG
600 TABLET ORAL EVERY 6 HOURS
Refills: 0 | Status: COMPLETED | OUTPATIENT
Start: 2022-09-08 | End: 2023-08-07

## 2022-09-08 RX ORDER — DIBUCAINE 1 %
1 OINTMENT (GRAM) RECTAL EVERY 6 HOURS
Refills: 0 | Status: DISCONTINUED | OUTPATIENT
Start: 2022-09-08 | End: 2022-09-09

## 2022-09-08 RX ORDER — BENZOCAINE 10 %
1 GEL (GRAM) MUCOUS MEMBRANE EVERY 6 HOURS
Refills: 0 | Status: DISCONTINUED | OUTPATIENT
Start: 2022-09-08 | End: 2022-09-09

## 2022-09-08 RX ADMIN — Medication 1 TABLET(S): at 12:46

## 2022-09-08 RX ADMIN — Medication 975 MILLIGRAM(S): at 20:32

## 2022-09-08 RX ADMIN — Medication 30 MILLIGRAM(S): at 04:00

## 2022-09-08 RX ADMIN — Medication 600 MILLIGRAM(S): at 17:39

## 2022-09-08 RX ADMIN — Medication 1000 MILLIUNIT(S)/MIN: at 03:26

## 2022-09-08 RX ADMIN — SODIUM CHLORIDE 3 MILLILITER(S): 9 INJECTION INTRAMUSCULAR; INTRAVENOUS; SUBCUTANEOUS at 06:14

## 2022-09-08 RX ADMIN — Medication 30 MILLIGRAM(S): at 03:30

## 2022-09-08 RX ADMIN — Medication 975 MILLIGRAM(S): at 09:53

## 2022-09-08 RX ADMIN — Medication 600 MILLIGRAM(S): at 12:46

## 2022-09-08 RX ADMIN — Medication 1000 MILLIUNIT(S)/MIN: at 03:01

## 2022-09-08 NOTE — OB PROVIDER DELIVERY SUMMARY - NSLACERATION_OBGYN_ALL_OB
Exam: Chest one view



HISTORY:Dyspnea. COPD. Chest pain.



Comparison: 5/30/2015, 7/2/2019



FINDINGS:

Cardiac silhouette:Upper normal cardiac silhouette.

Aorta: Atherosclerosis of the aorta

Pulmonary vessels: Prominent

Costophrenic angles: Clear



LUNGS: Stable fibrotic changes with hyperinflation.

Pneumothorax: None



Osseous abnormalities: None



IMPRESSION: 

1. Stable pulmonary fibrosis. Hyperinflation. COPD.

2. Atherosclerosis.



Reported By: Yamile Luevano 

Electronically Signed:  3/17/2020 7:21 AM
Yes

## 2022-09-08 NOTE — CHART NOTE - NSCHARTNOTEFT_GEN_A_CORE
Pt seen and examined after placement of Epidural for pain management.  She is comfortable with her Epidural in place and her fetal cardiotocography is reassuring on Pitocin augmentation.  SVE 6/80/-2/M/S; AROM with clear fluid.  Continue Pitocin augmentation.  All questions addressed.

## 2022-09-08 NOTE — OB PROVIDER DELIVERY SUMMARY - NSPROVIDERDELIVERYNOTE_OBGYN_ALL_OB_FT
at 0245 of a live female, weight pending and Apgars 9/9. Delivered HARVEY, loose nuchal cord x1, clear fluid. Infant's head delivered with maternal expulsive efforts. Shoulders delivered without difficulty followed by the rest of the body. Nose and mouth were bulb suctioned. Cord clamped and cut after delay. Samples obtained. Baby handed to patient. Placenta delivered spontaneously, intact, 3VC. Fundus firm, minimal bleeding. Perineum and vagina inspected – small 2nd degree perineal laceration repaired with chromic suture. QBL 74cc. Hemostasis noted. Pt tolerated procedure well, in stable condition, recovering in LDR. Infant in LDR. Instrument/sponge count correct x 2 and confirmed by nurse.

## 2022-09-08 NOTE — OB PROVIDER DELIVERY SUMMARY - NSSELHIDDEN_OBGYN_ALL_OB_FT
[NS_DeliveryAttending1_OBGYN_ALL_OB_FT:[id number]=],[NS_DeliveryRN_OBGYN_ALL_OB_FT:[id number]],[NS_CirculateRN2_OBGYN_ALL_OB_FT:[id number]=],[NS_DeliveryAssist1_OBGYN_ALL_OB_FT:[id number]=],[NS_DeliveryAssist2_OBGYN_ALL_OB_FT:[id number]=]

## 2022-09-08 NOTE — OB RN DELIVERY SUMMARY - NS_SEPSISRSKCALC_OBGYN_ALL_OB_FT
EOS calculated successfully. EOS Risk Factor: 0.5/1000 live births ([facility] national incidence); GA=39w6d; Temp=98.8; ROM=1.517; GBS='Negative'; Antibiotics='No antibiotics or any antibiotics < 2 hrs prior to birth'

## 2022-09-08 NOTE — OB PROVIDER DELIVERY SUMMARY - NS_DELIVERYATTENDING1_OBGYN_ALL_OB_FT
Josh Walker DO Principal Discharge DX:	Fall, initial encounter  Instructions for follow-up, activity and diet:	1) Please follow-up with your Primary Medical Doctor in 3-5 days. If you need to find a new physician, please call [phone].  2) Return to the Emergency Department if you experiences: chest pain, shortness of breath, fainting, dizziness, or symptoms that are new or recurrent.  3) If you have any questions or concerns, do not hesitate to contact us at [phone].  4) You had your laceration repaired with sutures or staples. Please keep the wound site clean and dry for 24 hours. Afterwards, change the bandaging twice a day and check for any signs of infection. If you notice any redness, swelling, or drainage, return immediately to the Emergency Department. Otherwise, please return to the ED or go to your Primary Care Physician to have the staples removed in 10 days.  Secondary Diagnosis:	Scalp laceration, initial encounter Principal Discharge DX:	Fall, initial encounter  Instructions for follow-up, activity and diet:	1) Please follow-up with your Primary Medical Doctor in 3-5 days. If you need to find a new physician, please call [phone].  2) Return to the Emergency Department if you experiences: chest pain, shortness of breath, fainting, dizziness, or symptoms that are new or recurrent.  3) If you have any questions or concerns, do not hesitate to contact us at [phone].  4) You had your laceration repaired with sutures or staples. Please keep the wound site clean and dry for 24 hours. Afterwards, change the bandaging twice a day and check for any signs of infection. If you notice any redness, swelling, or drainage, return immediately to the Emergency Department. Otherwise, please return to the ED or go to your Primary Care Physician to have the staples removed in 10 days.  Secondary Diagnosis:	Scalp laceration, initial encounter  Secondary Diagnosis:	UTI (urinary tract infection)

## 2022-09-08 NOTE — OB RN DELIVERY SUMMARY - NSSELHIDDEN_OBGYN_ALL_OB_FT
[NS_DeliveryAttending1_OBGYN_ALL_OB_FT:[id number]=],[NS_DeliveryRN_OBGYN_ALL_OB_FT:[id number]],[NS_CirculateRN2_OBGYN_ALL_OB_FT:[id number]=],[NS_DeliveryAssist1_OBGYN_ALL_OB_FT:[id number]=],[NS_DeliveryAssist2_OBGYN_ALL_OB_FT:[id number]=]

## 2022-09-09 VITALS
RESPIRATION RATE: 16 BRPM | SYSTOLIC BLOOD PRESSURE: 121 MMHG | HEART RATE: 69 BPM | OXYGEN SATURATION: 100 % | DIASTOLIC BLOOD PRESSURE: 80 MMHG | TEMPERATURE: 98 F

## 2022-09-09 PROCEDURE — 59050 FETAL MONITOR W/REPORT: CPT

## 2022-09-09 PROCEDURE — U0003: CPT

## 2022-09-09 PROCEDURE — G0463: CPT

## 2022-09-09 PROCEDURE — 36415 COLL VENOUS BLD VENIPUNCTURE: CPT

## 2022-09-09 PROCEDURE — 86780 TREPONEMA PALLIDUM: CPT

## 2022-09-09 PROCEDURE — 85014 HEMATOCRIT: CPT

## 2022-09-09 PROCEDURE — 86769 SARS-COV-2 COVID-19 ANTIBODY: CPT

## 2022-09-09 PROCEDURE — 82962 GLUCOSE BLOOD TEST: CPT

## 2022-09-09 PROCEDURE — 85025 COMPLETE CBC W/AUTO DIFF WBC: CPT

## 2022-09-09 PROCEDURE — 59025 FETAL NON-STRESS TEST: CPT

## 2022-09-09 PROCEDURE — 85018 HEMOGLOBIN: CPT

## 2022-09-09 PROCEDURE — 86900 BLOOD TYPING SEROLOGIC ABO: CPT

## 2022-09-09 PROCEDURE — U0005: CPT

## 2022-09-09 PROCEDURE — 86901 BLOOD TYPING SEROLOGIC RH(D): CPT

## 2022-09-09 PROCEDURE — 86850 RBC ANTIBODY SCREEN: CPT

## 2022-09-09 RX ORDER — IBUPROFEN 200 MG
1 TABLET ORAL
Qty: 56 | Refills: 0
Start: 2022-09-09 | End: 2022-09-22

## 2022-09-09 RX ORDER — ACETAMINOPHEN 500 MG
3 TABLET ORAL
Qty: 168 | Refills: 0
Start: 2022-09-09 | End: 2022-09-22

## 2022-09-09 RX ADMIN — Medication 600 MILLIGRAM(S): at 05:16

## 2022-09-09 RX ADMIN — Medication 975 MILLIGRAM(S): at 08:58

## 2022-09-09 NOTE — DISCHARGE NOTE OB - HOSPITAL COURSE
36yo  at 39w5d by LMP 12/3/21 c/w 1st trimester sono presenting for induction of labor. She delivered vaginally. Uncomplicated. Post partum meeting milestones and pain well controlled.

## 2022-09-09 NOTE — DISCHARGE NOTE OB - YES, WALK AS TOLERATED
Physical Therapy Evaluation and Treatment     Patient Name:  Mirna Eason   MRN:  [id number]    Recommendations:     Discharge Recommendations:  outpatient PT   Discharge Equipment Recommendations: none   Barriers to discharge: patient going to stay in the [facility] prior to flying home     Assessment:     Mirna Eason is a 68 y.o. female admitted with a medical diagnosis of Primary osteoarthritis of right knee.  She presents with the following impairments/functional limitations:  weakness, impaired functional mobilty, gait instability, impaired balance, decreased lower extremity function, decreased ROM, impaired joint extensibility, pain, impaired skin, orthopedic precautions.    Patient tolerated PT session well today. She ambulated 90ft with RW and CGA. No LOB or SOB noted. She is okay to ambulate with nursing staff assistance and RW.    Rehab Prognosis: Good; patient would benefit from acute skilled PT services to address these deficits and reach maximum level of function.    Recent Surgery: Procedure(s) (LRB):  ARTHROPLASTY, KNEE, TOTAL-[facility] (Right) Day of Surgery    Plan:     During this hospitalization, patient to be seen daily to address the identified rehab impairments via gait training, therapeutic activities, therapeutic exercises and progress toward the following goals:    · Plan of Care Expires:  10/29/19    Subjective     Chief Complaint: Pain in right knee.   Patient/Family Comments/goals: To walk without pain.   Pain/Comfort:  Pain Rating 1: 5/10  Location - Side 1: Right  Location 1: knee  Pain Addressed 1: Reposition, Distraction, Cessation of Activity, Nurse notified    Living Environment:  Patient lives alone in a [facility] with 2 steps and a left handrail to enter. Prior to admission, patients level of function was independent for functional mobility. Equipment used at home: walker, rolling (elevated toilet seat). Upon discharge, patient will have assistance from sister for 2 weeks or longer  if needed.    Objective:     Communicated with RN prior to session.  Patient found up in chair with cryotherapy, peripheral IV, FCD, perineural catheter  upon PT entry to room.    General Precautions: Standard, fall   Orthopedic Precautions:RLE weight bearing as tolerated   Braces: N/A     Exams:  · Cognitive Exam:  Patient is oriented to Person, Place, Time and Situation  · Sensation:    · -       Intact  · RLE ROM: WFL except limited at knee due to surgical pain  · RLE Strength: appears WFL but unable to formally assess  · LLE ROM: WFL  · LLE Strength: WFL    Functional Mobility:  · Transfers:     · Sit to Stand:  contact guard assistance with rolling walker x1 from bedside chair with verbal cues for hand placement   · Gait:  She ambulated 90ft with RW and CGA. No LOB or SOB noted.     Therapeutic Activities and Exercises:  Patient educated in:  -PT role and POC  -safety with transfers including hand placement  -gait sequencing and RW management  -OOB activity to maximize recovery including ambulating with nursing staff assistance and RW      AM-PAC 6 CLICK MOBILITY  Total Score:17     Patient left up in chair with all lines intact, call button in reach, RN notified and sister present.    GOALS:   Multidisciplinary Problems     Physical Therapy Goals        Problem: Physical Therapy Goal    Goal Priority Disciplines Outcome Goal Variances Interventions   Physical Therapy Goal     PT, PT/OT Ongoing, Progressing     Description:  Goals to be met by: 10/29/19    Patient will increase functional independence with mobility by performin. Supine to sit with supervision  2. Sit to supine with supervision  3. Sit to stand transfer with Supervision  4. Gait x200 feet with Supervision using Rolling Walker  5. Ascend/Descend 2 steps with left handrail and contact guard assistance  6. Lower extremity exercise program x30 reps per handout, with supervision                        History:     Past Medical History:    Diagnosis Date    Arthritis     GERD (gastroesophageal reflux disease)     Meniere disease, left        Past Surgical History:   Procedure Laterality Date    appendectomy  1980's    fracture surgery- fell down thestairs- Ankle - 80's      mechanical fall     LAPAROSCOPIC CHOLECYSTECTOMY      oophorectomy, [place]- Rt - 1980's         Time Tracking:     PT Received On: 10/22/19  PT Start Time: 1520     PT Stop Time: 1536  PT Total Time (min): 16 min     Billable Minutes: Evaluation 8 and Gait Training 8      Carlee Dill, PT  10/22/2019   Statement Selected

## 2022-09-09 NOTE — DISCHARGE NOTE OB - MEDICATION SUMMARY - MEDICATIONS TO TAKE
I will START or STAY ON the medications listed below when I get home from the hospital:    acetaminophen 325 mg oral tablet  -- 3 tab(s) by mouth every 6 hours   -- This product contains acetaminophen.  Do not use  with any other product containing acetaminophen to prevent possible liver damage.    -- Indication: For pain    ibuprofen 600 mg oral tablet  -- 1 tab(s) by mouth every 6 hours   -- Do not take this drug if you are pregnant.  It is very important that you take or use this exactly as directed.  Do not skip doses or discontinue unless directed by your doctor.  May cause drowsiness or dizziness.  Obtain medical advice before taking any non-prescription drugs as some may affect the action of this medication.  Take with food or milk.    -- Indication: For pain

## 2022-09-09 NOTE — DISCHARGE NOTE OB - MATERIALS PROVIDED
[facility] [place] Screening Program/Breastfeeding Log/Breastfeeding Mother’s Support Group Information/[facility] Hearing Screen Program/Back To Sleep Handout/Shaken Baby Prevention Handout/Breastfeeding Guide and Packet/Birth Certificate Instructions/Tdap Vaccination (VIS Pub Date: 2012)

## 2022-09-09 NOTE — PROGRESS NOTE ADULT - ASSESSMENT
A/P:  NICOLA TOLEDO is a 37y  now PPD#1 s/p spontaneous vaginal delivery at 39w5d weeks gestation, uncomplicated.    -Vital signs stable  -Hgb: 12.0 -> AM labs pending   -Voiding, tolerating PO  -Advance care as tolerated   -Ambulation encouraged  -Continue routine postpartum care and education  -Healthy female infant  -Dispo: Anticipate discharge to home pending attending approval. A/P:  NICOLA TOLEDO is a 37y  now PPD#1 s/p uncomplicated spontaneous vaginal delivery at 39w5d gestation.    -Vital signs stable  -Hgb: 12.0 -> AM labs pending   -Voiding, tolerating PO  -Advance care as tolerated   -Ambulation encouraged  -Continue routine postpartum care and education  -Healthy female infant  -Dispo: Anticipate discharge to home pending attending approval. A/P:  NICOLA TOLEDO is a 37y  now PPD#1 s/p uncomplicated spontaneous vaginal delivery at 39w5d gestation.    -Vital signs stable  -Post-partum Hgb stable at 12.0   -Voiding, tolerating PO  -Advance care as tolerated   -Ambulation encouraged  -Continue routine postpartum care and education  -Healthy female infant  -Dispo: Anticipate discharge to home today pending attending approval.    PGY4 Addendum: Subjective Hx, Physical Exam, & Laboratory results reviewed. I agree with the assessment and plan of care, as discussed above, and have edited as necessary.  DIANNE Mendoza MD

## 2022-09-09 NOTE — DISCHARGE NOTE OB - CARE PROVIDER_API CALL
Josh Walker (DO)  Obstetrics and Gynecology  [address] [facility], [facility] [address] [place] [address]  Phone: [phone]  Fax: [phone]  Follow Up Time: 1 week

## 2022-09-09 NOTE — DISCHARGE NOTE OB - PATIENT PORTAL LINK FT
You can access the FollowMyHealth Patient Portal offered by [facility] by registering at the following website: [email or web address]. By joining [employer]’s FollowMyHealth portal, you will also be able to view your health information using other applications (apps) compatible with our system.

## 2022-09-09 NOTE — PROGRESS NOTE ADULT - SUBJECTIVE AND OBJECTIVE BOX
NICOLA TOLEDO is a 37y  now PPD#1 s/p spontaneous vaginal delivery at 39w5d weeks gestation, uncomplicated.    S:    No acute events overnight.   The patient has no complaints.  Pain controlled with current treatment regimen.   She is ambulating without difficulty and tolerating PO.   + flatus/-BM/+ voiding   She endorses appropriate lochia, which is decreasing.   She is breastfeeding without difficulty.   She denies fevers, chills, nausea and vomiting.   She denies lightheadedness, dizziness, palpitations, chest pain and SOB.     O:    T(C): 36.9 (22 @ 15:47), Max: 36.9 (22 @ 15:47)  HR: 77 (22 @ 15:47) (70 - 80)  BP: 111/73 (22 @ 15:47) (111/73 - 119/75)  RR: 18 (22 @ 15:47) (16 - 20)  SpO2: 100% (22 @ 15:47) (98% - 100%)    Gen: NAD, AOx3  CV: RRR, S1/S2 present  Pulm: CTAB  Abdomen:  Soft, non-tender, non-distended  Uterus:  Fundus firm below umbilicus  VE:  Expected lochia  Ext:  b/l LE non-tender                           12.0   x     )-----------( x        ( 08 Sep 2022 13:30 )             36.0          NICOLA TOLEDO is a 37y  now PPD#1 s/p uncomplicated spontaneous vaginal delivery at 39w5d gestation.    S:    No acute events overnight.   The patient has no complaints.  Pain controlled with current treatment regimen.   She is ambulating without difficulty and tolerating PO.   + flatus/-BM/+ voiding   She endorses appropriate lochia, which is decreasing.   She is breastfeeding without difficulty.   She denies fevers, chills, nausea and vomiting.   She denies lightheadedness, dizziness, palpitations, chest pain and SOB.     O:    T(C): 36.9 (22 @ 15:47), Max: 36.9 (22 @ 15:47)  HR: 77 (22 @ 15:47) (70 - 80)  BP: 111/73 (22 @ 15:47) (111/73 - 119/75)  RR: 18 (22 @ 15:47) (16 - 20)  SpO2: 100% (22 @ 15:47) (98% - 100%)    Gen: NAD, AOx3  CV: RRR, S1/S2 present  Pulm: CTAB  Abdomen:  Soft, non-tender, non-distended  Uterus:  Fundus firm below umbilicus  VE:  Expected lochia  Ext:  b/l LE non-tender                           12.0   x     )-----------( x        ( 08 Sep 2022 13:30 )             36.0

## 2022-09-09 NOTE — CDI QUERY NOTE - NSCDIOTHERTXTBX_GEN_ALL_CORE_HH
Acute blood loss anemia was documented in the discharge note, can you please provide the clinical criteria to support this diagnosis?  A.	Acute blood loss anemia ruled out  B.	Acute blood loss anemmia as evidence by  (please specify)  C.	Other, please specify  D.	Not clinically significant      Supporting Documentation:      Hemoglobin:  Hemoglobin: 12.0 g/dL (09.08.22 @ 13:30)   Hemoglobin: 12.8 g/dL (09.07.22 @ 21:00)     Historical Values Hemoglobin:  Hemoglobin: 11.5 g/dL (08.06.19 @ 08:34)   Hemoglobin: 13.3 g/dL (08.05.19 @ 22:27)       Discharge Note OB [Charted Location: [facility] 2EST 2028 01] [Authored: 09-Sep-2022 02:47]  4) Please continue taking vitamins postpartum. Take iron and colace for acute blood loss anemia.

## 2022-09-09 NOTE — DISCHARGE NOTE OB - CARE PLAN
1 Principal Discharge DX:	Normal spontaneous vaginal delivery  Assessment and plan of treatment:	Patient post-partum had an uncomplicated hospital course. Her pain was well controlled. She is tolerating a regular diet. She is ambulating independently. Labs and Vitals WNL upon discharge.     1) Please take ibuprofen and/or Tylenol as needed for pain as prescribed.  2) Nothing in the vagina for 6 weeks (including no sex, no tampons, and no douching).  3) Please call your doctor for a follow up your postpartum appointment in 1-2 weeks.  4) Please continue taking vitamins postpartum. Take iron and colace for acute blood loss anemia.  5) Please call the office sooner if you have heavy vaginal bleeding, severe abdominal pain, or fever > 100.4F.  6) You may resume regular daily activity as tolerated

## 2022-09-09 NOTE — DISCHARGE NOTE OB - NURSING SECTION COMPLETE
Resting quietly in recliner with feet elevated, warmer applied and call light in reach. Patient/Caregiver provided printed discharge information.

## 2022-09-09 NOTE — DISCHARGE NOTE OB - NS MD DC FALL RISK RISK
For information on Fall & Injury Prevention, visit: [email or web address] OR  [email or web address] OR  https://www.cdc.gov/steadi/patient.html

## 2022-09-19 ENCOUNTER — RX RENEWAL (OUTPATIENT)
Age: 37
End: 2022-09-19

## 2022-10-14 ENCOUNTER — APPOINTMENT (OUTPATIENT)
Dept: OBGYN | Facility: CLINIC | Age: 37
End: 2022-10-14

## 2022-10-14 VITALS
BODY MASS INDEX: 21.03 KG/M2 | HEIGHT: 67 IN | SYSTOLIC BLOOD PRESSURE: 122 MMHG | DIASTOLIC BLOOD PRESSURE: 82 MMHG | WEIGHT: 134 LBS

## 2022-10-14 DIAGNOSIS — O24.419 GESTATIONAL DIABETES MELLITUS IN PREGNANCY, UNSPECIFIED CONTROL: ICD-10-CM

## 2022-10-14 DIAGNOSIS — N91.1 SECONDARY AMENORRHEA: ICD-10-CM

## 2022-10-14 DIAGNOSIS — O99.810 ABNORMAL GLUCOSE COMPLICATING PREGNANCY: ICD-10-CM

## 2022-10-14 DIAGNOSIS — Z87.898 PERSONAL HISTORY OF OTHER SPECIFIED CONDITIONS: ICD-10-CM

## 2022-10-14 DIAGNOSIS — N94.89 OTHER SPECIFIED CONDITIONS ASSOCIATED WITH FEMALE GENITAL ORGANS AND MENSTRUAL CYCLE: ICD-10-CM

## 2022-10-14 DIAGNOSIS — O09.529 SUPERVISION OF ELDERLY MULTIGRAVIDA, UNSPECIFIED TRIMESTER: ICD-10-CM

## 2022-10-14 DIAGNOSIS — Q99.9 CHROMOSOMAL ABNORMALITY, UNSPECIFIED: ICD-10-CM

## 2022-10-14 DIAGNOSIS — Z34.93 ENCOUNTER FOR SUPERVISION OF NORMAL PREGNANCY, UNSPECIFIED, THIRD TRIMESTER: ICD-10-CM

## 2022-10-14 DIAGNOSIS — O35.1XX0 MATERNAL CARE FOR (SUSPECTED) CHROMOSOMAL ABNORMALITY IN FETUS, NOT APPLICABLE OR UNSPECIFIED: ICD-10-CM

## 2022-10-14 PROCEDURE — 0503F POSTPARTUM CARE VISIT: CPT

## 2022-10-14 RX ORDER — LANCETS 33 GAUGE
EACH MISCELLANEOUS
Qty: 1 | Refills: 2 | Status: COMPLETED | COMMUNITY
Start: 2022-06-28 | End: 2022-10-14

## 2022-10-14 RX ORDER — ASPIRIN 81 MG
81 TABLET, DELAYED RELEASE (ENTERIC COATED) ORAL
Refills: 0 | Status: COMPLETED | COMMUNITY
End: 2022-10-14

## 2022-10-14 RX ORDER — BLOOD-GLUCOSE METER
KIT MISCELLANEOUS
Qty: 100 | Refills: 3 | Status: COMPLETED | COMMUNITY
Start: 2022-06-28 | End: 2022-10-14

## 2022-10-14 RX ORDER — LANCETS 28 GAUGE
EACH MISCELLANEOUS
Qty: 100 | Refills: 2 | Status: COMPLETED | COMMUNITY
Start: 2022-09-19 | End: 2022-10-14

## 2022-10-14 RX ORDER — BLOOD-GLUCOSE METER
W/DEVICE KIT MISCELLANEOUS
Qty: 1 | Refills: 0 | Status: COMPLETED | COMMUNITY
Start: 2022-06-28 | End: 2022-10-14

## 2022-10-14 NOTE — HISTORY OF PRESENT ILLNESS
[FreeTextEntry1] : 37-year-old female G2, P2 presenting office status post vaginal delivery on 9/8/2022 delivering a baby girl weighing 3790 g, 8 pounds 6 ounces, with Apgars 9 and 9 at 39 weeks and 6 days gestation.  Delivery was complicated by second-degree midline laceration which was repaired in normal fashion.  Patient has no complaints at this time.  Patient recently stopped/discontinued breast-feeding.  Her menstrual period has not returned, but she does have sporadic spotting and vaginal bleeding.  No signs or symptoms of acute anemia.  She does not wish for contraception, as her  plans to have a vasectomy.

## 2022-10-14 NOTE — PLAN
[FreeTextEntry1] : \par Patient is doing well in the postpartum period.  Patient was counseled on expectations regarding return of her menstrual period after discontinuation of breast-feeding.  Contraception was discussed, and her  plans to go forward with vasectomy.  Patient was handed a list of local urology offices who provide this service.  Patient was counseled on contraception and directed that it is important for him to return to his follow-up visits until his sperm count is 0.  She is given option ask questions all questions were addressed.  She will return to office in 3 months time for annual well woman appointment.\par \par Patient has a 2-hour GCT and a HbA1c placed secondary to gestational diabetes during pregnancy.  She was directed on optimal timing for these test.

## 2023-01-01 NOTE — LETTER CLOSING
[FreeTextEntry1] : Discussed with parent(s) appropriate expectations regarding feeding, jaundice, weight loss/gain and urine/stool outputs.  Patient currently within normal expectations Urinary/stool outputs within expected range  Parents supported and questions/concerns addressed Reinforced back to sleep Recheck in office: 1 week PE, sooner for concerns tcb downtrending to 12.1 recheck 1 week Discussed signs/symptoms that would require immediate care.  Mother expressed understanding. [Thank you very much for allowing me to participate in the care of this patient.] : Thank you very much for allowing me to participate in the care of this patient [If you have any questions, please do not hesitate to contact our office.] : If you have any questions, please do not hesitate to contact our office. [Sincerely,] : Sincerely,

## 2023-05-24 NOTE — DISCHARGE NOTE OB - DO NOT DRIVE OR OPERATE HEAVY MACHINERY WHEN TAKING NARCOTICS/PRESCRIPTION PAIN MEDICATION THAT CAN CHANGE YOUR MENTAL STATE OR CAUSE DROWSINESS
Pt awake and alert, skin w/d,resps reg/unlabored. Pt c/o increase in right flank pain. Pt aware awaiting CT results. Statement Selected

## 2023-10-03 NOTE — OB RN PATIENT PROFILE - FALL HARM RISK - CONCLUSION
Rest, push fluids, follow up with primary care for recheck. Return to the Emergency Dept for any continued/worsening headache, visual changes, confusion, weakness, or problems with speech/ambulation.
Universal Safety Interventions

## 2025-02-19 NOTE — OB RN PATIENT PROFILE - NS PRO DEPRESSION SCREENING Y/N1
UROLOGY FOLLOW UP  2/20/2025    UROLOGY CHIEF COMPLAINT  Chief Complaint   Patient presents with    Office Visit    Follow-up     Discuss lab results         UROLOGY HISTORY OF PRESENT ILLNESS    Ck Mercado is a 67 year old male who presents in follow-up for     1)Elevated psa-   Father had a prostate surgery.   HTN, HLD, QUYNH (uses CPAP), depression.  -10/2023- MRI that showed a PI-RADS 3 lesion.    -11/2023- MRI fusion guided biopsy November 2023 showed no cancer.  There were atypical prostate cells seen.   -now with PSA normalized.     2)BPH w/ LUTS-   -tamsulosin      3)OAB- Having significant urgency. Worse based on fluid intake. DTF 60-90 minutes after taking AM diuretics.   -decreased caffeine intake      Patient presents for follow up BPH. LOV 8/6/24     meds: flomax     Voiding: stable   Nocturia: 1x  Dysuria: no  Hematuria: no      MEDICATIONS    Current Outpatient Medications   Medication Sig    lamoTRIgine (LaMICtal) 100 MG tablet TAKE 1 TABLET BY MOUTH DAILY    hydrALAZINE (APRESOLINE) 25 MG tablet TAKE 1 TABLET BY MOUTH IN THE MORNING AND IN THE EVENING    tiZANidine (ZANAFLEX) 4 MG tablet Take 1 tablet by mouth every 6 hours as needed (Muscle spasms).    sertraline (ZOLOFT) 100 MG tablet TAKE 1 TABLET BY MOUTH DAILY    amLODIPine (NORVASC) 10 MG tablet Take 1 tablet by mouth daily.    atorvastatin (LIPITOR) 20 MG tablet Take 1 tablet by mouth daily.    lisinopril (ZESTRIL) 40 MG tablet Take 1 tablet by mouth daily.    omeprazole (PrilOSEC) 20 MG capsule Take 1 capsule by mouth daily.    spironolactone (ALDACTONE) 50 MG tablet Take 1 tablet by mouth daily.    tamsulosin (FLOMAX) 0.4 MG Cap Take 1 capsule by mouth daily.    Valacyclovir HCl (Valtrex) 1000 MG Tab 2 tabs in am and again in pm for onset of cold sores (Patient not taking: Reported on 1/6/2025)    amoxicillin (AMOXIL) 500 MG capsule Take 4 capsules by mouth daily as needed (dental cleaning). One hour prior to procedure (Patient  not taking: Reported on 1/6/2025)    fluticasone (FLONASE) 50 MCG/ACT nasal spray Spray 2 sprays in each nostril daily. (Patient not taking: Reported on 10/18/2024)    acetaminophen (TYLENOL) 500 MG tablet Take 500 mg by mouth every 6 hours as needed for Pain.    cyclobenzaprine (Flexeril) 10 MG tablet Take 1 tablet by mouth 3 times daily as needed for Muscle spasms. (Patient not taking: Reported on 1/6/2025)    ammonium lactate (AMLACTIN) 12 % lotion Apply 3 times daily to trunk and extremities. (Patient not taking: Reported on 1/6/2025)    diclofenac (VOLTAREN) 1 % gel Apply 4 g topically 4 times daily as needed (knee pain). Apply to the knees (Patient not taking: Reported on 10/18/2024)     No current facility-administered medications for this visit.       ALLERGIES    ALLERGIES:   Allergen Reactions    Environmental [Other]      Itchy eyes, rhinitis.    Ozempic (0.25 Or 0.5 Mg-Dose) [Semaglutide] Other (See Comments)     Sbo while on wegovy         PHYSICAL EXAMINATION    Vitals Signs:  There were no vitals taken for this visit.  General:  Alert and oriented, no acute distress.      DATA REVIEWED      results    PSA, Total (NG/ML)   Date Value   04/27/2009 1.18   04/25/2008 0.94     Prostate Specific Antigen (ng/mL)   Date Value   02/07/2025 4.25   07/29/2024 4.21   02/26/2024 4.21   08/01/2023 6.07 (H)   02/10/2023 4.67 (H)   06/25/2021 3.06        Assessment/plan   1) Elevated psa/ atypical prostate cells-   -10/2023- MRI that showed a PI-RADS 3 lesion.    -11/2023- MRI fusion guided biopsy November 2023 showed no cancer.  There were atypical prostate cells seen.   -now with PSA normalized.  -Since PSA remains stable we will continue with annual PSA checks    2) BPH w/ urgency  -on flomax  -Significant symptom bother    Plan:  Follow up 1 year with psa prior   Continue flomax    NERI Pittman         no